# Patient Record
Sex: MALE | Race: WHITE | NOT HISPANIC OR LATINO | Employment: FULL TIME | URBAN - NONMETROPOLITAN AREA
[De-identification: names, ages, dates, MRNs, and addresses within clinical notes are randomized per-mention and may not be internally consistent; named-entity substitution may affect disease eponyms.]

---

## 2017-02-27 ENCOUNTER — OFFICE VISIT (OUTPATIENT)
Dept: GASTROENTEROLOGY | Facility: CLINIC | Age: 42
End: 2017-02-27

## 2017-02-27 VITALS
HEART RATE: 84 BPM | HEIGHT: 70 IN | WEIGHT: 208.5 LBS | DIASTOLIC BLOOD PRESSURE: 79 MMHG | SYSTOLIC BLOOD PRESSURE: 123 MMHG | BODY MASS INDEX: 29.85 KG/M2

## 2017-02-27 DIAGNOSIS — K21.9 GASTROESOPHAGEAL REFLUX DISEASE, ESOPHAGITIS PRESENCE NOT SPECIFIED: ICD-10-CM

## 2017-02-27 DIAGNOSIS — K62.5 HEMORRHAGE OF ANUS AND RECTUM: ICD-10-CM

## 2017-02-27 DIAGNOSIS — K57.32 DIVERTICULITIS OF LARGE INTESTINE WITHOUT PERFORATION OR ABSCESS WITHOUT BLEEDING: Primary | ICD-10-CM

## 2017-02-27 PROCEDURE — 99243 OFF/OP CNSLTJ NEW/EST LOW 30: CPT | Performed by: PHYSICIAN ASSISTANT

## 2017-02-27 RX ORDER — SODIUM CHLORIDE 0.9 % (FLUSH) 0.9 %
1-10 SYRINGE (ML) INJECTION AS NEEDED
Status: CANCELLED | OUTPATIENT
Start: 2017-02-27

## 2017-02-27 RX ORDER — DEXTROSE AND SODIUM CHLORIDE 5; .45 G/100ML; G/100ML
30 INJECTION, SOLUTION INTRAVENOUS CONTINUOUS PRN
Status: CANCELLED | OUTPATIENT
Start: 2017-02-27

## 2017-02-28 RX ORDER — SODIUM, POTASSIUM,MAG SULFATES 17.5-3.13G
SOLUTION, RECONSTITUTED, ORAL ORAL
Qty: 1 BOTTLE | Refills: 0 | Status: ON HOLD | OUTPATIENT
Start: 2017-02-28 | End: 2017-04-07

## 2017-04-07 ENCOUNTER — ANESTHESIA EVENT (OUTPATIENT)
Dept: GASTROENTEROLOGY | Facility: HOSPITAL | Age: 42
End: 2017-04-07

## 2017-04-07 ENCOUNTER — HOSPITAL ENCOUNTER (OUTPATIENT)
Facility: HOSPITAL | Age: 42
Setting detail: HOSPITAL OUTPATIENT SURGERY
Discharge: HOME | End: 2017-04-07
Attending: INTERNAL MEDICINE | Admitting: INTERNAL MEDICINE

## 2017-04-07 ENCOUNTER — ANESTHESIA (OUTPATIENT)
Dept: GASTROENTEROLOGY | Facility: HOSPITAL | Age: 42
End: 2017-04-07

## 2017-04-07 VITALS
HEIGHT: 70 IN | TEMPERATURE: 96.6 F | HEART RATE: 87 BPM | WEIGHT: 198.63 LBS | RESPIRATION RATE: 18 BRPM | SYSTOLIC BLOOD PRESSURE: 108 MMHG | DIASTOLIC BLOOD PRESSURE: 75 MMHG | OXYGEN SATURATION: 96 % | BODY MASS INDEX: 28.44 KG/M2

## 2017-04-07 DIAGNOSIS — K57.32 DIVERTICULITIS OF LARGE INTESTINE WITHOUT PERFORATION OR ABSCESS WITHOUT BLEEDING: ICD-10-CM

## 2017-04-07 DIAGNOSIS — K62.5 HEMORRHAGE OF ANUS AND RECTUM: ICD-10-CM

## 2017-04-07 DIAGNOSIS — K21.9 GASTROESOPHAGEAL REFLUX DISEASE, ESOPHAGITIS PRESENCE NOT SPECIFIED: ICD-10-CM

## 2017-04-07 PROCEDURE — 25010000002 PROPOFOL 10 MG/ML EMULSION: Performed by: NURSE ANESTHETIST, CERTIFIED REGISTERED

## 2017-04-07 PROCEDURE — 25010000002 MIDAZOLAM PER 1 MG: Performed by: NURSE ANESTHETIST, CERTIFIED REGISTERED

## 2017-04-07 PROCEDURE — 45380 COLONOSCOPY AND BIOPSY: CPT | Performed by: INTERNAL MEDICINE

## 2017-04-07 PROCEDURE — 88305 TISSUE EXAM BY PATHOLOGIST: CPT | Performed by: PATHOLOGY

## 2017-04-07 PROCEDURE — 88313 SPECIAL STAINS GROUP 2: CPT | Performed by: INTERNAL MEDICINE

## 2017-04-07 PROCEDURE — 88313 SPECIAL STAINS GROUP 2: CPT | Performed by: PATHOLOGY

## 2017-04-07 PROCEDURE — 43239 EGD BIOPSY SINGLE/MULTIPLE: CPT | Performed by: INTERNAL MEDICINE

## 2017-04-07 PROCEDURE — 25010000002 FENTANYL CITRATE (PF) 100 MCG/2ML SOLUTION: Performed by: NURSE ANESTHETIST, CERTIFIED REGISTERED

## 2017-04-07 PROCEDURE — 88305 TISSUE EXAM BY PATHOLOGIST: CPT | Performed by: INTERNAL MEDICINE

## 2017-04-07 RX ORDER — DEXTROSE AND SODIUM CHLORIDE 5; .45 G/100ML; G/100ML
30 INJECTION, SOLUTION INTRAVENOUS CONTINUOUS PRN
Status: DISCONTINUED | OUTPATIENT
Start: 2017-04-07 | End: 2017-04-07 | Stop reason: HOSPADM

## 2017-04-07 RX ORDER — PROPOFOL 10 MG/ML
VIAL (ML) INTRAVENOUS AS NEEDED
Status: DISCONTINUED | OUTPATIENT
Start: 2017-04-07 | End: 2017-04-07 | Stop reason: SURG

## 2017-04-07 RX ORDER — MIDAZOLAM HYDROCHLORIDE 1 MG/ML
INJECTION INTRAMUSCULAR; INTRAVENOUS AS NEEDED
Status: DISCONTINUED | OUTPATIENT
Start: 2017-04-07 | End: 2017-04-07 | Stop reason: SURG

## 2017-04-07 RX ORDER — FENTANYL CITRATE 50 UG/ML
INJECTION, SOLUTION INTRAMUSCULAR; INTRAVENOUS AS NEEDED
Status: DISCONTINUED | OUTPATIENT
Start: 2017-04-07 | End: 2017-04-07 | Stop reason: SURG

## 2017-04-07 RX ORDER — LIDOCAINE HYDROCHLORIDE 10 MG/ML
INJECTION, SOLUTION INFILTRATION; PERINEURAL AS NEEDED
Status: DISCONTINUED | OUTPATIENT
Start: 2017-04-07 | End: 2017-04-07 | Stop reason: SURG

## 2017-04-07 RX ORDER — SODIUM CHLORIDE, SODIUM GLUCONATE, SODIUM ACETATE, POTASSIUM CHLORIDE, AND MAGNESIUM CHLORIDE 526; 502; 368; 37; 30 MG/100ML; MG/100ML; MG/100ML; MG/100ML; MG/100ML
INJECTION, SOLUTION INTRAVENOUS CONTINUOUS PRN
Status: DISCONTINUED | OUTPATIENT
Start: 2017-04-07 | End: 2017-04-07 | Stop reason: SURG

## 2017-04-07 RX ADMIN — SODIUM CHLORIDE, SODIUM GLUCONATE, SODIUM ACETATE, POTASSIUM CHLORIDE, AND MAGNESIUM CHLORIDE: 526; 502; 368; 37; 30 INJECTION, SOLUTION INTRAVENOUS at 14:41

## 2017-04-07 RX ADMIN — PROPOFOL 50 MG: 10 INJECTION, EMULSION INTRAVENOUS at 14:55

## 2017-04-07 RX ADMIN — PROPOFOL 50 MG: 10 INJECTION, EMULSION INTRAVENOUS at 14:59

## 2017-04-07 RX ADMIN — FENTANYL CITRATE 50 MCG: 50 INJECTION, SOLUTION INTRAMUSCULAR; INTRAVENOUS at 14:44

## 2017-04-07 RX ADMIN — DEXTROSE AND SODIUM CHLORIDE 30 ML/HR: 5; 450 INJECTION, SOLUTION INTRAVENOUS at 14:09

## 2017-04-07 RX ADMIN — MIDAZOLAM 1 MG: 1 INJECTION INTRAMUSCULAR; INTRAVENOUS at 14:42

## 2017-04-07 RX ADMIN — LIDOCAINE HYDROCHLORIDE 50 MG: 10 INJECTION, SOLUTION INFILTRATION; PERINEURAL at 14:44

## 2017-04-07 RX ADMIN — MIDAZOLAM 1 MG: 1 INJECTION INTRAMUSCULAR; INTRAVENOUS at 14:44

## 2017-04-07 RX ADMIN — FENTANYL CITRATE 50 MCG: 50 INJECTION, SOLUTION INTRAMUSCULAR; INTRAVENOUS at 14:42

## 2017-04-07 RX ADMIN — PROPOFOL 50 MG: 10 INJECTION, EMULSION INTRAVENOUS at 14:50

## 2017-04-07 RX ADMIN — PROPOFOL 50 MG: 10 INJECTION, EMULSION INTRAVENOUS at 14:44

## 2017-04-07 RX ADMIN — PROPOFOL 20 MG: 10 INJECTION, EMULSION INTRAVENOUS at 14:47

## 2017-04-07 NOTE — ANESTHESIA POSTPROCEDURE EVALUATION
Patient: Grover Lee    Procedure Summary     Date Anesthesia Start Anesthesia Stop Room / Location    04/07/17 1440 1501 [facility] ENDOSCOPY 1 / [facility] ENDOSCOPY       Procedure Diagnosis Surgeon Provider    ESOPHAGOGASTRODUODENOSCOPY (N/A Esophagus); COLONOSCOPY (N/A ) Hemorrhage of anus and rectum; Diverticulitis of large intestine without perforation or abscess without bleeding; Gastroesophageal reflux disease, esophagitis presence not specified  (Hemorrhage of anus and rectum [K62.5]; Diverticulitis of large intestine without perforation or abscess without bleeding [K57.32]; Gastroesophageal reflux disease, esophagitis presence not specified [K21.9]) MD Iram Kirk CRNA          Anesthesia Type: MAC  Last vitals  BP      Temp      Pulse     Resp      SpO2        Post Anesthesia Care and Evaluation    Patient location during evaluation: bedside  Patient participation: complete - patient participated  Level of consciousness: awake and awake and alert  Pain score: 0  Pain management: adequate  Airway patency: patent  Anesthetic complications: No anesthetic complications  PONV Status: none  Cardiovascular status: acceptable  Respiratory status: acceptable  Hydration status: acceptable

## 2017-04-07 NOTE — PLAN OF CARE
Problem: Patient Care Overview (Adult)  Goal: Plan of Care Review  Outcome: Ongoing (interventions implemented as appropriate)    04/07/17 1501   Coping/Psychosocial Response Interventions   Plan Of Care Reviewed With patient   Patient Care Overview   Progress no change         Problem: GI Endoscopy (Adult)  Goal: Signs and Symptoms of Listed Potential Problems Will be Absent or Manageable (GI Endoscopy)  Outcome: Ongoing (interventions implemented as appropriate)    04/07/17 1501   GI Endoscopy   Problems Assessed (GI Endoscopy) all   Problems Present (GI Endoscopy) none

## 2017-04-07 NOTE — H&P
Progress Notes  Encounter Date: 4/7/2017  Grvoer rudd  Gastroenterology   Expand All Collapse All    []Hide copied text  []Hover for attribution information  Chief Complaint   Patient presents with   • Abdominal Pain   • Diverticulitis         ENDO PROCEDURE ORDERED: EGD/COLON bx, GERD/diverticulitis        Subjective        Grover Lee is a 41 y.o. male. he is being seen for consultation today at the request of MARBELLA Fuller.     History of Present Illness     This 41-year-old  was sent for consultation abdominal pain, diverticulitis by Vitaly Coats who saw the patient with complaints of abdominal pain on 2/16/17. Patient states he had pain in the low abdomen, from his penis to his rectum. He was having fever, he went to urgent care but they sent him to the emergency room. He was doing better until last week. He felt sick when he woke up, it seemed to get worse. He states he was given antibiotics but no studies were performed. He is on Bentyl for IBS symptoms. He has had some bright red blood per rectum which he thinks is from hemorrhoids. He does have frequent heartburn and takes Prilosec regularly. He also uses baking soda. He denied nausea, vomiting, dysphagia. His weight is been fairly stable. He has never had endoscopy.     Patient previously went to the ER on 10/10/16 was told he had diverticulitis and treated with Cipro, Flagyl, Bentyl. CMP showed a creatinine 1.2, otherwise normal. Urinalysis showed trace abnormalities with blood and bacteria. CBC showed a white count of 17.8, hemoglobin 14.7, 236 platelets. A CT scan abdomen pelvis without contrast showed atelectasis of the lungs, liver, gallbladder, spleen, pancreas appeared normal. Multiple colonic diverticuli. Segmented wall thickening with air in the mid to sigmoid with stranding, suggestive of acute diverticulitis, no nodes, no collection, mild degeneration the spine. He states the antibiotics seemed to make him  sick or and he went back on 10/12/16 CBC showed a white count of 13.5, hemoglobin 13.5, 220 platelets. Urinalysis showed ketones and trace abnormalities. CMP showed glucose 114, otherwise normal. Normal lipase. Flat and upright of the abdomen was normal.     Patient uses chewing tobacco for the past 20 years, strongly urged quit. Denied alcohol, illicit substance use. He has a history of a broken back, broken left arm, vasectomy, double hernia. Family history of diverticular disease, no known family history GI tract malignancy. Father, mother, spouse in good health.  since 1993. Mother's had severe diverticular disease. 2 sisters in good health, one child in good health.     A/P: GERD, abdominal pain, apparent diverticular disease with most likely recurrent diverticulitis. Certainly another form of colitis cannot be totally excluded. Doubt but cannot exclude malignancy. Recommend EGD with colonoscopy and biopsies. Consider trial on Asacol and this was briefly discussed with the patient. Recommend he continue on current medications. We'll see him in follow-up after the above, further pending clinical course and the results of the above.     Thank you very much Vitaly for this consultation and for allowing us to participate in the care of your patient. We'll keep you informed.      The following portions of the patient's history were reviewed and updated as appropriate:    Medical History         Past Medical History   Diagnosis Date   • Diverticulitis            Surgical History          Past Surgical History   Procedure Laterality Date   • Hernia repair       • Knee arthroscopy       • Vasectomy                   Family History   Problem Relation Age of Onset   • Diverticulitis Mother     • No Known Problems Father           No Known Allergies   Social History    Social History            Social History   • Marital status:        Spouse name: N/A   • Number of children: N/A   • Years of education: N/A  "           Social History Main Topics   • Smoking status: Current Every Day Smoker       Packs/day: 0.50   • Smokeless tobacco: Current User   • Alcohol use No   • Drug use: No   • Sexual activity: Not Asked           Other Topics Concern   • None      Social History Narrative            Current Outpatient Prescriptions:   • dicyclomine (BENTYL) 10 MG capsule, Take 1 capsule by mouth 4 (Four) Times a Day As Needed (as needed for abdominal cramping)., Disp: 40 capsule, Rfl: 0  • Omeprazole Magnesium (PRILOSEC OTC PO), Take 22.3 mg by mouth Daily., Disp: , Rfl:   • ondansetron ODT (ZOFRAN-ODT) 4 MG disintegrating tablet, Take 1 tablet by mouth Every 8 (Eight) Hours As Needed for nausea or vomiting., Disp: 30 tablet, Rfl: 0  • sodium-potassium-magnesium sulfates (SUPREP) solution oral solution, As Directed, Disp: 1 bottle, Rfl: 0  Review of Systems  Review of Systems   Constitutional: Positive for fever.   Gastrointestinal: Positive for abdominal pain, blood in stool and diarrhea.             Objective                Visit Vitals   • /79 (BP Location: Left arm)   • Pulse 84   • Ht 70\" (177.8 cm)   • Wt 208 lb 8 oz (94.6 kg)   • BMI 29.92 kg/m2      Physical Exam   Constitutional: He is oriented to person, place, and time. He appears well-developed and well-nourished. No distress.   HENT:   Head: Normocephalic and atraumatic.   Eyes: EOM are normal. Pupils are equal, round, and reactive to light.   Neck: Normal range of motion.   Cardiovascular: Normal rate, regular rhythm and normal heart sounds.   Pulmonary/Chest: Effort normal and breath sounds normal.   Abdominal: Soft. Bowel sounds are normal. He exhibits no shifting dullness, no distension, no abdominal bruit, no ascites and no mass. There is no hepatosplenomegaly. There is tenderness. There is no rigidity, no rebound, no guarding and no CVA tenderness. No hernia. Hernia confirmed negative in the ventral area.   Musculoskeletal: Normal range of motion. "   Neurological: He is alert and oriented to person, place, and time.   Skin: Skin is warm and dry.   Psychiatric: He has a normal mood and affect. His behavior is normal. Judgment and thought content normal.   Nursing note and vitals reviewed.     No results found for any previous visit.     Assessment/Plan           1. Diverticulitis of large intestine without perforation or abscess without bleeding    2. Hemorrhage of anus and rectum    3. Gastroesophageal reflux disease, esophagitis presence not specified    .   Grover was seen today for abdominal pain and diverticulitis.     Diagnoses and all orders for this visit:     Diverticulitis of large intestine without perforation or abscess without bleeding  - Case Request; Standing  - sodium chloride 0.9 % flush 1-10 mL; Infuse 1-10 mL into a venous catheter As Needed for line care.  - dextrose 5 % and sodium chloride 0.45 % infusion; Infuse 30 mL/hr into a venous catheter Continuous As Needed (Start Prior to Procedure).  - Case Request     Hemorrhage of anus and rectum  - Case Request; Standing  - sodium chloride 0.9 % flush 1-10 mL; Infuse 1-10 mL into a venous catheter As Needed for line care.  - dextrose 5 % and sodium chloride 0.45 % infusion; Infuse 30 mL/hr into a venous catheter Continuous As Needed (Start Prior to Procedure).  - Case Request     Gastroesophageal reflux disease, esophagitis presence not specified  - Case Request; Standing  - sodium chloride 0.9 % flush 1-10 mL; Infuse 1-10 mL into a venous catheter As Needed for line care.  - Case Request     Other orders  - Obtain Informed Consent; Standing  - Verify Informed Consent; Standing  - POC Glucose Fingerstick; Standing  - Insert Peripheral IV; Standing  - Saline Lock & Maintain IV Access; Standing  - sodium-potassium-magnesium sulfates (SUPREP) solution oral solution; As Directed           Orders placed during this encounter include:  No orders of the defined types were placed in this  encounter.        Medications prescribed:       New Medications Ordered This Visit   Medications   • sodium-potassium-magnesium sulfates (SUPREP) solution oral solution       Sig: As Directed       Dispense: 1 bottle       Refill: 0         Requested Prescriptions             Signed Prescriptions Disp Refills   • sodium-potassium-magnesium sulfates (SUPREP) solution oral solution 1 bottle 0       Sig: As Directed         Review and/or summary of lab tests, radiology, procedures, medications. Review and summary of old records and obtaining of history. The risks and benefits of my recommendations, as well as other treatment options were discussed with the patient today. Questions were answered.     Follow-up: Return if symptoms worsen or fail to improve, for After the above.     ESOPHAGOGASTRODUODENOSCOPY (N/A), COLONOSCOPY (N/A)        This document has been electronically signed by Quincy Yousif PA-C on March 10, 2017 12:07 PM      No results found for this or any previous visit.     Some portions of this note have been dictated using voice recognition software and may contain errors and/or omissions.            Office Visit on 2/27/2017              Detailed Report

## 2017-04-07 NOTE — ANESTHESIA PREPROCEDURE EVALUATION
Anesthesia Evaluation     Patient summary reviewed      Airway   Mallampati: II  Neck ROM: full  no difficulty expected  Dental      Pulmonary - normal exam   (+) a smoker Current,   Cardiovascular - normal exam        Neuro/Psych  GI/Hepatic/Renal/Endo    (+)  GERD well controlled,     Musculoskeletal     Abdominal    Substance History   (+) alcohol use,      OB/GYN          Other        ROS/Med Hx Other: diverticulitis                          Anesthesia Plan    ASA 2     MAC     intravenous induction   Anesthetic plan and risks discussed with patient.

## 2017-04-10 LAB
LAB AP CASE REPORT: NORMAL
Lab: NORMAL
PATH REPORT.FINAL DX SPEC: NORMAL
PATH REPORT.GROSS SPEC: NORMAL

## 2017-05-01 ENCOUNTER — OFFICE VISIT (OUTPATIENT)
Dept: GASTROENTEROLOGY | Facility: CLINIC | Age: 42
End: 2017-05-01

## 2017-05-01 VITALS
WEIGHT: 204.4 LBS | SYSTOLIC BLOOD PRESSURE: 112 MMHG | HEIGHT: 70 IN | BODY MASS INDEX: 29.26 KG/M2 | DIASTOLIC BLOOD PRESSURE: 75 MMHG | HEART RATE: 65 BPM

## 2017-05-01 DIAGNOSIS — R10.13 EPIGASTRIC PAIN: ICD-10-CM

## 2017-05-01 DIAGNOSIS — K29.00 OTHER ACUTE GASTRITIS: ICD-10-CM

## 2017-05-01 DIAGNOSIS — K21.00 GASTROESOPHAGEAL REFLUX DISEASE WITH ESOPHAGITIS: Primary | ICD-10-CM

## 2017-05-01 PROCEDURE — 99214 OFFICE O/P EST MOD 30 MIN: CPT | Performed by: PHYSICIAN ASSISTANT

## 2017-05-01 RX ORDER — PANTOPRAZOLE SODIUM 40 MG/1
40 TABLET, DELAYED RELEASE ORAL DAILY
Qty: 30 TABLET | Refills: 5 | Status: SHIPPED | OUTPATIENT
Start: 2017-05-01 | End: 2017-11-08

## 2017-05-04 ENCOUNTER — TRANSCRIBE ORDERS (OUTPATIENT)
Dept: LAB | Facility: HOSPITAL | Age: 42
End: 2017-05-04

## 2017-11-08 ENCOUNTER — OFFICE VISIT (OUTPATIENT)
Dept: GASTROENTEROLOGY | Facility: CLINIC | Age: 42
End: 2017-11-08

## 2017-11-08 ENCOUNTER — APPOINTMENT (OUTPATIENT)
Dept: LAB | Facility: HOSPITAL | Age: 42
End: 2017-11-08

## 2017-11-08 VITALS
BODY MASS INDEX: 30.26 KG/M2 | SYSTOLIC BLOOD PRESSURE: 112 MMHG | WEIGHT: 211.4 LBS | HEART RATE: 68 BPM | HEIGHT: 70 IN | DIASTOLIC BLOOD PRESSURE: 82 MMHG

## 2017-11-08 DIAGNOSIS — R10.13 EPIGASTRIC PAIN: ICD-10-CM

## 2017-11-08 DIAGNOSIS — K57.32 DIVERTICULITIS OF LARGE INTESTINE WITHOUT PERFORATION OR ABSCESS WITHOUT BLEEDING: ICD-10-CM

## 2017-11-08 DIAGNOSIS — K21.00 GASTROESOPHAGEAL REFLUX DISEASE WITH ESOPHAGITIS: Primary | ICD-10-CM

## 2017-11-08 LAB
ALBUMIN SERPL-MCNC: 4.5 G/DL (ref 3.4–4.8)
ALBUMIN/GLOB SERPL: 1.3 G/DL (ref 1.1–1.8)
ALP SERPL-CCNC: 50 U/L (ref 38–126)
ALT SERPL W P-5'-P-CCNC: 51 U/L (ref 21–72)
ANION GAP SERPL CALCULATED.3IONS-SCNC: 13 MMOL/L (ref 5–15)
AST SERPL-CCNC: 74 U/L (ref 17–59)
BASOPHILS # BLD AUTO: 0.05 10*3/MM3 (ref 0–0.2)
BASOPHILS NFR BLD AUTO: 0.6 % (ref 0–2)
BILIRUB SERPL-MCNC: 0.3 MG/DL (ref 0.2–1.3)
BUN BLD-MCNC: 10 MG/DL (ref 7–21)
BUN/CREAT SERPL: 9.7 (ref 7–25)
CALCIUM SPEC-SCNC: 9.5 MG/DL (ref 8.4–10.2)
CHLORIDE SERPL-SCNC: 100 MMOL/L (ref 95–110)
CO2 SERPL-SCNC: 27 MMOL/L (ref 22–31)
CREAT BLD-MCNC: 1.03 MG/DL (ref 0.7–1.3)
DEPRECATED RDW RBC AUTO: 38.3 FL (ref 35.1–43.9)
EOSINOPHIL # BLD AUTO: 0.34 10*3/MM3 (ref 0–0.7)
EOSINOPHIL NFR BLD AUTO: 3.8 % (ref 0–7)
ERYTHROCYTE [DISTWIDTH] IN BLOOD BY AUTOMATED COUNT: 12.1 % (ref 11.5–14.5)
GFR SERPL CREATININE-BSD FRML MDRD: 78 ML/MIN/1.73 (ref 63–147)
GLOBULIN UR ELPH-MCNC: 3.4 GM/DL (ref 2.3–3.5)
GLUCOSE BLD-MCNC: 91 MG/DL (ref 60–100)
HCT VFR BLD AUTO: 41.4 % (ref 39–49)
HGB BLD-MCNC: 14.3 G/DL (ref 13.7–17.3)
IMM GRANULOCYTES # BLD: 0.02 10*3/MM3 (ref 0–0.02)
IMM GRANULOCYTES NFR BLD: 0.2 % (ref 0–0.5)
LYMPHOCYTES # BLD AUTO: 3.13 10*3/MM3 (ref 0.6–4.2)
LYMPHOCYTES NFR BLD AUTO: 34.6 % (ref 10–50)
MCH RBC QN AUTO: 29.6 PG (ref 26.5–34)
MCHC RBC AUTO-ENTMCNC: 34.5 G/DL (ref 31.5–36.3)
MCV RBC AUTO: 85.7 FL (ref 80–98)
MONOCYTES # BLD AUTO: 0.85 10*3/MM3 (ref 0–0.9)
MONOCYTES NFR BLD AUTO: 9.4 % (ref 0–12)
NEUTROPHILS # BLD AUTO: 4.66 10*3/MM3 (ref 2–8.6)
NEUTROPHILS NFR BLD AUTO: 51.4 % (ref 37–80)
PLATELET # BLD AUTO: 218 10*3/MM3 (ref 150–450)
PMV BLD AUTO: 9.7 FL (ref 8–12)
POTASSIUM BLD-SCNC: 4.5 MMOL/L (ref 3.5–5.1)
PROT SERPL-MCNC: 7.9 G/DL (ref 6.3–8.6)
RBC # BLD AUTO: 4.83 10*6/MM3 (ref 4.37–5.74)
SODIUM BLD-SCNC: 140 MMOL/L (ref 137–145)
WBC NRBC COR # BLD: 9.05 10*3/MM3 (ref 3.2–9.8)

## 2017-11-08 PROCEDURE — 85025 COMPLETE CBC W/AUTO DIFF WBC: CPT | Performed by: PHYSICIAN ASSISTANT

## 2017-11-08 PROCEDURE — 80053 COMPREHEN METABOLIC PANEL: CPT | Performed by: PHYSICIAN ASSISTANT

## 2017-11-08 PROCEDURE — 99214 OFFICE O/P EST MOD 30 MIN: CPT | Performed by: PHYSICIAN ASSISTANT

## 2017-11-08 PROCEDURE — 36415 COLL VENOUS BLD VENIPUNCTURE: CPT | Performed by: PHYSICIAN ASSISTANT

## 2017-11-08 RX ORDER — DEXLANSOPRAZOLE 60 MG/1
60 CAPSULE, DELAYED RELEASE ORAL DAILY
Qty: 90 CAPSULE | Refills: 1 | Status: SHIPPED | OUTPATIENT
Start: 2017-11-08 | End: 2017-12-08

## 2017-11-08 RX ORDER — ONDANSETRON 4 MG/1
4 TABLET, ORALLY DISINTEGRATING ORAL EVERY 8 HOURS PRN
Qty: 30 TABLET | Refills: 1 | Status: SHIPPED | OUTPATIENT
Start: 2017-11-08 | End: 2019-07-09 | Stop reason: SDUPTHER

## 2017-11-08 RX ORDER — OMEPRAZOLE 20 MG/1
20 TABLET, DELAYED RELEASE ORAL AS NEEDED
COMMUNITY
End: 2017-12-14 | Stop reason: ALTCHOICE

## 2017-11-08 RX ORDER — METRONIDAZOLE 500 MG/1
500 TABLET ORAL 2 TIMES DAILY
Qty: 20 TABLET | Refills: 0 | Status: SHIPPED | OUTPATIENT
Start: 2017-11-08 | End: 2018-01-17

## 2017-11-08 NOTE — PROGRESS NOTES
Chief Complaint   Patient presents with   • Abdominal Pain       ENDO PROCEDURE ORDERED:    Subjective    Grover Lee is a 42 y.o. male. he is here today for follow-up.    History of Present Illness    Patient seen on a recheck of his epigastric pain.  Last seen 5/1/17.  Patient stated he had a flare.  He had some Flagyl and took the medication.  He got better after taking it for a few days.  He states he's had no significant symptoms since that time.  He states in the past he has used Cipro, Flagyl, Zofran when he gets a flare.  He currently denies abdominal pain, nausea vomiting, bowels are moving without blood.  Weight is up 7 pounds since last visit.  Last EGD/colonoscopy on 4/7/17 showed esophagitis, gastritis, diverticulosis.     A/P: Patient with recurrent bouts of abdominal pain, known diverticular disease, GERD.  Recommend CBC, CMP.  I refilled his Zofran, will try switching to Dexilant given his breakthrough GERD symptoms.  I gave him another round of Flagyl, but I asked him to call if he has another bout of pain.  We'll consider imaging, laboratories it is severe.  Otherwise plan follow-up in 6 months, sooner if needed.     The following portions of the patient's history were reviewed and updated as appropriate:   Past Medical History:   Diagnosis Date   • Diverticulitis      Past Surgical History:   Procedure Laterality Date   • COLONOSCOPY N/A 4/7/2017    Procedure: COLONOSCOPY;  Surgeon: Grover Pearson MD;  Location: [facility] ENDOSCOPY;  Service:    • ENDOSCOPY N/A 4/7/2017    Procedure: ESOPHAGOGASTRODUODENOSCOPY;  Surgeon: Grover Pearson MD;  Location: [facility] ENDOSCOPY;  Service:    • HERNIA REPAIR     • KNEE ARTHROSCOPY     • UPPER GASTROINTESTINAL ENDOSCOPY  04/07/2017   • VASECTOMY       Family History   Problem Relation Age of Onset   • Diverticulitis Mother    • No Known Problems Father        No Known Allergies  Social History     Social History   • Marital status:      Spouse  "name: N/A   • Number of children: N/A   • Years of education: N/A     Social History Main Topics   • Smoking status: Former Smoker   • Smokeless tobacco: Current User      Comment: smokes occasionally    • Alcohol use No   • Drug use: No   • Sexual activity: Not Asked     Other Topics Concern   • None     Social History Narrative       Current Outpatient Prescriptions:   •  omeprazole OTC (PRILOSEC OTC) 20 MG EC tablet, Take 20 mg by mouth As Needed., Disp: , Rfl:   •  ondansetron ODT (ZOFRAN-ODT) 4 MG disintegrating tablet, Take 1 tablet by mouth Every 8 (Eight) Hours As Needed for Nausea or Vomiting., Disp: 30 tablet, Rfl: 1  •  dexlansoprazole (DEXILANT) 60 MG capsule, Take 1 capsule by mouth Daily for 30 days., Disp: 90 capsule, Rfl: 1  •  dicyclomine (BENTYL) 10 MG capsule, Take 1 capsule by mouth 4 (Four) Times a Day As Needed (as needed for abdominal cramping)., Disp: 40 capsule, Rfl: 0  •  metroNIDAZOLE (FLAGYL) 500 MG tablet, Take 1 tablet by mouth 2 (Two) Times a Day., Disp: 20 tablet, Rfl: 0  Review of Systems  Review of Systems       Objective    /82 (BP Location: Left arm)  Pulse 68  Ht 70\" (177.8 cm)  Wt 211 lb 6.4 oz (95.9 kg)  BMI 30.33 kg/m2  Physical Exam   Constitutional: He is oriented to person, place, and time. He appears well-developed and well-nourished. No distress.   HENT:   Head: Normocephalic and atraumatic.   Eyes: EOM are normal. Pupils are equal, round, and reactive to light.   Neck: Normal range of motion.   Cardiovascular: Normal rate, regular rhythm and normal heart sounds.    Pulmonary/Chest: Effort normal and breath sounds normal.   Abdominal: Soft. Bowel sounds are normal. He exhibits no shifting dullness, no distension, no abdominal bruit, no ascites and no mass. There is no hepatosplenomegaly. There is tenderness. There is no rigidity, no rebound, no guarding and no CVA tenderness. No hernia. Hernia confirmed negative in the ventral area.   Musculoskeletal: Normal " range of motion.   Neurological: He is alert and oriented to person, place, and time.   Skin: Skin is warm and dry.   Psychiatric: He has a normal mood and affect. His behavior is normal. Judgment and thought content normal.   Nursing note and vitals reviewed.    Assessment/Plan      1. Gastroesophageal reflux disease with esophagitis    2. Epigastric pain    3. Diverticulitis of large intestine without perforation or abscess without bleeding    4. Diverticulitis of large intestine without perforation or abscess without bleeding    .   Grover was seen today for abdominal pain.    Diagnoses and all orders for this visit:    Gastroesophageal reflux disease with esophagitis  -     CBC Auto Differential  -     Comprehensive Metabolic Panel    Epigastric pain  -     CBC Auto Differential  -     Comprehensive Metabolic Panel    Diverticulitis of large intestine without perforation or abscess without bleeding  Comments:  recurent  Orders:  -     ondansetron ODT (ZOFRAN-ODT) 4 MG disintegrating tablet; Take 1 tablet by mouth Every 8 (Eight) Hours As Needed for Nausea or Vomiting.  -     CBC Auto Differential  -     Comprehensive Metabolic Panel    Diverticulitis of large intestine without perforation or abscess without bleeding  -     ondansetron ODT (ZOFRAN-ODT) 4 MG disintegrating tablet; Take 1 tablet by mouth Every 8 (Eight) Hours As Needed for Nausea or Vomiting.  -     CBC Auto Differential  -     Comprehensive Metabolic Panel    Other orders  -     dexlansoprazole (DEXILANT) 60 MG capsule; Take 1 capsule by mouth Daily for 30 days.  -     metroNIDAZOLE (FLAGYL) 500 MG tablet; Take 1 tablet by mouth 2 (Two) Times a Day.        Orders placed during this encounter include:  Orders Placed This Encounter   Procedures   • CBC Auto Differential   • Comprehensive Metabolic Panel       Medications prescribed:  New Medications Ordered This Visit   Medications   • dexlansoprazole (DEXILANT) 60 MG capsule     Sig: Take 1 capsule  by mouth Daily for 30 days.     Dispense:  90 capsule     Refill:  1   • ondansetron ODT (ZOFRAN-ODT) 4 MG disintegrating tablet     Sig: Take 1 tablet by mouth Every 8 (Eight) Hours As Needed for Nausea or Vomiting.     Dispense:  30 tablet     Refill:  1   • metroNIDAZOLE (FLAGYL) 500 MG tablet     Sig: Take 1 tablet by mouth 2 (Two) Times a Day.     Dispense:  20 tablet     Refill:  0     Discontinued Medications       Reason for Discontinue    pantoprazole (PROTONIX) 40 MG EC tablet Not Efficacious        Requested Prescriptions     Signed Prescriptions Disp Refills   • dexlansoprazole (DEXILANT) 60 MG capsule 90 capsule 1     Sig: Take 1 capsule by mouth Daily for 30 days.   • ondansetron ODT (ZOFRAN-ODT) 4 MG disintegrating tablet 30 tablet 1     Sig: Take 1 tablet by mouth Every 8 (Eight) Hours As Needed for Nausea or Vomiting.   • metroNIDAZOLE (FLAGYL) 500 MG tablet 20 tablet 0     Sig: Take 1 tablet by mouth 2 (Two) Times a Day.       Review and/or summary of lab tests, radiology, procedures, medications. Review and summary of old records and obtaining of history. The risks and benefits of my recommendations, as well as other treatment options were discussed with the patient today. Questions were answered.    Follow-up: Return in about 6 months (around 5/8/2018), or if symptoms worsen or fail to improve.     * Surgery not found *      This document has been electronically signed by Quincy Yousif PA-C on November 30, 2017 6:58 PM      Results for orders placed or performed in visit on 11/08/17   CBC Auto Differential   Result Value Ref Range    WBC 9.05 3.20 - 9.80 10*3/mm3    RBC 4.83 4.37 - 5.74 10*6/mm3    Hemoglobin 14.3 13.7 - 17.3 g/dL    Hematocrit 41.4 39.0 - 49.0 %    MCV 85.7 80.0 - 98.0 fL    MCH 29.6 26.5 - 34.0 pg    MCHC 34.5 31.5 - 36.3 g/dL    RDW 12.1 11.5 - 14.5 %    RDW-SD 38.3 35.1 - 43.9 fl    MPV 9.7 8.0 - 12.0 fL    Platelets 218 150 - 450 10*3/mm3    Neutrophil % 51.4 37.0 - 80.0  %    Lymphocyte % 34.6 10.0 - 50.0 %    Monocyte % 9.4 0.0 - 12.0 %    Eosinophil % 3.8 0.0 - 7.0 %    Basophil % 0.6 0.0 - 2.0 %    Immature Grans % 0.2 0.0 - 0.5 %    Neutrophils, Absolute 4.66 2.00 - 8.60 10*3/mm3    Lymphocytes, Absolute 3.13 0.60 - 4.20 10*3/mm3    Monocytes, Absolute 0.85 0.00 - 0.90 10*3/mm3    Eosinophils, Absolute 0.34 0.00 - 0.70 10*3/mm3    Basophils, Absolute 0.05 0.00 - 0.20 10*3/mm3    Immature Grans, Absolute 0.02 0.00 - 0.02 10*3/mm3   Comprehensive Metabolic Panel   Result Value Ref Range    Glucose 91 60 - 100 mg/dL    BUN 10 7 - 21 mg/dL    Creatinine 1.03 0.70 - 1.30 mg/dL    Sodium 140 137 - 145 mmol/L    Potassium 4.5 3.5 - 5.1 mmol/L    Chloride 100 95 - 110 mmol/L    CO2 27.0 22.0 - 31.0 mmol/L    Calcium 9.5 8.4 - 10.2 mg/dL    Total Protein 7.9 6.3 - 8.6 g/dL    Albumin 4.50 3.40 - 4.80 g/dL    ALT (SGPT) 51 21 - 72 U/L    AST (SGOT) 74 (H) 17 - 59 U/L    Alkaline Phosphatase 50 38 - 126 U/L    Total Bilirubin 0.3 0.2 - 1.3 mg/dL    eGFR Non  Amer 78 63 - 147 mL/min/1.73    Globulin 3.4 2.3 - 3.5 gm/dL    A/G Ratio 1.3 1.1 - 1.8 g/dL    BUN/Creatinine Ratio 9.7 7.0 - 25.0    Anion Gap 13.0 5.0 - 15.0 mmol/L   Results for orders placed or performed during the hospital encounter of 04/07/17   Tissue Exam   Result Value Ref Range    Case Report       Surgical Pathology Report                         Case: [id number]                                  Authorizing Provider:  Grover Pearson MD         Collected:           04/07/2017 02:49 PM          Ordering Location:     [facility]             Received:            04/07/2017 03:41 PM                                 [facility]                                                     Pathologist:           Prashanth Odom MD                                                          Specimens:   1) - Small Intestine, Duodenum, small bowel                                                         2) - Gastric,  Antrum, biopsy                                                                        3) - Esophagus, Distal, biopsy                                                                      4) - Small Intestine, Ileum, TI biopsy                                                              5) - Large Intestine, colonic mucosa biopsy                                                Final Diagnosis       1.  MUCOSA, DUODENUM:  UNREMARKABLE TALL VILLOUS MUCOSA OF THE DUODENUM.   NEGATIVE FOR PARASITES.     2.  MUCOSA, ANTRUM OF STOMACH:   CONGESTION OF ANTRAL GASTRIC MUCOSA.     3.  MUCOSA, DISTAL ESOPHAGUS:   REACTIVE CHANGE OF SQUAMOUS MUCOSA.   NEGATIVE FOR DYSPLASIA AND GOBLET CELL METAPLASIA (ALCIAN BLUE/PAS STAIN).     4.  MUCOSA, TERMINAL ILEUM:   UNREMARKABLE TALL VILLOUS MUCOSA OF THE TERMINAL ILEUM.    5.  MUCOSA, COLON:   UNREMARKABLE MUCOSA OF THE COLON.       Gross Description       Received for examination are 5 containers, each of which have nodular bits of white soft tissue measuring 0.3-0.5 cc in aggregate.  All specimens are embedded as labeled:  1A duodenum; 2A antrum of stomach; 3A distal esophagus; 4A terminal ileum; 5A mucosa of colon.        Embedded Images         Some portions of this note have been dictated using voice recognition software and may contain errors and/or omissions.

## 2017-12-14 ENCOUNTER — OFFICE VISIT (OUTPATIENT)
Dept: GASTROENTEROLOGY | Facility: CLINIC | Age: 42
End: 2017-12-14

## 2017-12-14 ENCOUNTER — APPOINTMENT (OUTPATIENT)
Dept: LAB | Facility: HOSPITAL | Age: 42
End: 2017-12-14

## 2017-12-14 ENCOUNTER — HOSPITAL ENCOUNTER (OUTPATIENT)
Dept: CT IMAGING | Facility: HOSPITAL | Age: 42
Discharge: HOME | End: 2017-12-14
Admitting: PHYSICIAN ASSISTANT

## 2017-12-14 VITALS
HEART RATE: 92 BPM | SYSTOLIC BLOOD PRESSURE: 128 MMHG | WEIGHT: 216.4 LBS | HEIGHT: 70 IN | BODY MASS INDEX: 30.98 KG/M2 | DIASTOLIC BLOOD PRESSURE: 80 MMHG

## 2017-12-14 DIAGNOSIS — R10.33 PERIUMBILICAL ABDOMINAL PAIN: ICD-10-CM

## 2017-12-14 DIAGNOSIS — K21.00 GASTROESOPHAGEAL REFLUX DISEASE WITH ESOPHAGITIS: ICD-10-CM

## 2017-12-14 DIAGNOSIS — K57.32 DIVERTICULITIS OF LARGE INTESTINE WITHOUT PERFORATION OR ABSCESS WITHOUT BLEEDING: Primary | ICD-10-CM

## 2017-12-14 LAB
BASOPHILS # BLD AUTO: 0.02 10*3/MM3 (ref 0–0.2)
BASOPHILS NFR BLD AUTO: 0.2 % (ref 0–2)
BUN BLD-MCNC: 12 MG/DL (ref 7–21)
CREAT BLD-MCNC: 1.24 MG/DL (ref 0.7–1.3)
DEPRECATED RDW RBC AUTO: 38.2 FL (ref 35.1–43.9)
EOSINOPHIL # BLD AUTO: 0.18 10*3/MM3 (ref 0–0.7)
EOSINOPHIL NFR BLD AUTO: 1.7 % (ref 0–7)
ERYTHROCYTE [DISTWIDTH] IN BLOOD BY AUTOMATED COUNT: 12.1 % (ref 11.5–14.5)
GFR SERPL CREATININE-BSD FRML MDRD: 63 ML/MIN/1.73 (ref 63–147)
HCT VFR BLD AUTO: 43.8 % (ref 39–49)
HGB BLD-MCNC: 14.9 G/DL (ref 13.7–17.3)
IMM GRANULOCYTES # BLD: 0.02 10*3/MM3 (ref 0–0.02)
IMM GRANULOCYTES NFR BLD: 0.2 % (ref 0–0.5)
LYMPHOCYTES # BLD AUTO: 3.01 10*3/MM3 (ref 0.6–4.2)
LYMPHOCYTES NFR BLD AUTO: 28.4 % (ref 10–50)
MCH RBC QN AUTO: 29.3 PG (ref 26.5–34)
MCHC RBC AUTO-ENTMCNC: 34 G/DL (ref 31.5–36.3)
MCV RBC AUTO: 86.1 FL (ref 80–98)
MONOCYTES # BLD AUTO: 1.37 10*3/MM3 (ref 0–0.9)
MONOCYTES NFR BLD AUTO: 12.9 % (ref 0–12)
NEUTROPHILS # BLD AUTO: 5.98 10*3/MM3 (ref 2–8.6)
NEUTROPHILS NFR BLD AUTO: 56.6 % (ref 37–80)
PLATELET # BLD AUTO: 247 10*3/MM3 (ref 150–450)
PMV BLD AUTO: 10.5 FL (ref 8–12)
RBC # BLD AUTO: 5.09 10*6/MM3 (ref 4.37–5.74)
WBC NRBC COR # BLD: 10.58 10*3/MM3 (ref 3.2–9.8)

## 2017-12-14 PROCEDURE — 36415 COLL VENOUS BLD VENIPUNCTURE: CPT | Performed by: PHYSICIAN ASSISTANT

## 2017-12-14 PROCEDURE — 0 DIATRIZOATE MEGLUMINE & SODIUM PER 1 ML: Performed by: PHYSICIAN ASSISTANT

## 2017-12-14 PROCEDURE — 82565 ASSAY OF CREATININE: CPT | Performed by: PHYSICIAN ASSISTANT

## 2017-12-14 PROCEDURE — 84520 ASSAY OF UREA NITROGEN: CPT | Performed by: PHYSICIAN ASSISTANT

## 2017-12-14 PROCEDURE — 0 IOPAMIDOL 61 % SOLUTION: Performed by: PHYSICIAN ASSISTANT

## 2017-12-14 PROCEDURE — 74177 CT ABD & PELVIS W/CONTRAST: CPT

## 2017-12-14 PROCEDURE — 99214 OFFICE O/P EST MOD 30 MIN: CPT | Performed by: PHYSICIAN ASSISTANT

## 2017-12-14 PROCEDURE — 85025 COMPLETE CBC W/AUTO DIFF WBC: CPT | Performed by: PHYSICIAN ASSISTANT

## 2017-12-14 RX ORDER — DEXLANSOPRAZOLE 60 MG/1
60 CAPSULE, DELAYED RELEASE ORAL DAILY
COMMUNITY
End: 2018-02-07 | Stop reason: SDUPTHER

## 2017-12-14 RX ADMIN — DIATRIZOATE MEGLUMINE AND DIATRIZOATE SODIUM 40 ML: 660; 100 LIQUID ORAL; RECTAL at 18:00

## 2017-12-14 RX ADMIN — IOPAMIDOL 95 ML: 612 INJECTION, SOLUTION INTRAVENOUS at 19:15

## 2017-12-14 NOTE — PROGRESS NOTES
Chief Complaint   Patient presents with   • Diverticulitis   • Abdominal Pain       ENDO PROCEDURE ORDERED:    Subjective    Grover Lee is a 42 y.o. male. he is here today for follow-up.    History of Present Illness    Patient seen for acute onset abdominal pain this morning.  He's had recurrent mid to lower abdominal pain in the past without clear diagnosis.  It has been felt likely due to diverticular disease in the past.  He was last seen 11/8/17.  I had given him a prescription for Flagyl and asked him to contact the office if he had another flare to be seen.  He called today stating he had increasing abdominal pain.  It is in the mid to low abdomen.  He thinks he may have had a fever this morning.  He's had normal bowel movements this week but nothing this morning which is unusual for him.  He does have Bentyl.  He thinks that the Dexilant is doing much better for his heartburn.  He complains a 4 out of 10 abdominal pain today.  No nausea, no vomiting.  Weight is up 5 pounds since last visit.  Last colonoscopy 4/7/17.  He did start taking the Flagyl this morning.    Laboratory from last visit CMP showed an AST of 74, otherwise normal.  CBC was normal.    A/P: Acute abdominal pain suspect acute diverticulitis.  Will have patient get laboratory, CT scan abdomen pelvis today and return.  Note: CT scan was done after the office had closed.  I did review his films personally, he does have multiple small diverticulum in the left colon, he appeared to have some stranding and haziness around his sigmoid colon posterior and superior to the bladder.  This was confirmed by official reading by the radiologist.  I did report this to the patient in the waiting room from CT scan with his mother.  I recommended he continue on antibiotics.  2 keep his diet light.  If he had any significant worsening of his symptoms I recommended he go to the emergency room.  He is to let me know in the next few days how he is doing,  as long as he is improved we'll continue with current course.  Consider a trial on 5-ASA given his recurrent bouts of diverticulitis.  At this point would not recommend surgical intervention.     The following portions of the patient's history were reviewed and updated as appropriate:   Past Medical History:   Diagnosis Date   • Diverticulitis      Past Surgical History:   Procedure Laterality Date   • COLONOSCOPY N/A 4/7/2017    Procedure: COLONOSCOPY;  Surgeon: Grover Pearson MD;  Location: [facility] ENDOSCOPY;  Service:    • ENDOSCOPY N/A 4/7/2017    Procedure: ESOPHAGOGASTRODUODENOSCOPY;  Surgeon: Grover Pearson MD;  Location: [facility] ENDOSCOPY;  Service:    • HERNIA REPAIR     • KNEE ARTHROSCOPY     • UPPER GASTROINTESTINAL ENDOSCOPY  04/07/2017   • VASECTOMY       Family History   Problem Relation Age of Onset   • Diverticulitis Mother    • No Known Problems Father        No Known Allergies  Social History     Social History   • Marital status:      Spouse name: N/A   • Number of children: N/A   • Years of education: N/A     Social History Main Topics   • Smoking status: Former Smoker   • Smokeless tobacco: Current User      Comment: smokes occasionally    • Alcohol use No   • Drug use: No   • Sexual activity: Not Asked     Other Topics Concern   • None     Social History Narrative       Current Outpatient Prescriptions:   •  dexlansoprazole (DEXILANT) 60 MG capsule, Take 60 mg by mouth Daily., Disp: , Rfl:   •  dicyclomine (BENTYL) 10 MG capsule, Take 1 capsule by mouth 4 (Four) Times a Day As Needed (as needed for abdominal cramping)., Disp: 40 capsule, Rfl: 0  •  metroNIDAZOLE (FLAGYL) 500 MG tablet, Take 1 tablet by mouth 2 (Two) Times a Day., Disp: 20 tablet, Rfl: 0  •  ondansetron ODT (ZOFRAN-ODT) 4 MG disintegrating tablet, Take 1 tablet by mouth Every 8 (Eight) Hours As Needed for Nausea or Vomiting., Disp: 30 tablet, Rfl: 1  No current facility-administered medications for this visit.   Review of  "Systems  Review of Systems       Objective    /80 (BP Location: Left arm)  Pulse 92  Ht 177.8 cm (70\")  Wt 98.2 kg (216 lb 6.4 oz)  BMI 31.05 kg/m2  Physical Exam   Constitutional: He is oriented to person, place, and time. He appears well-developed and well-nourished. He appears distressed.   Ill appearing   HENT:   Head: Normocephalic and atraumatic.   Eyes: EOM are normal. Pupils are equal, round, and reactive to light.   Neck: Normal range of motion.   Cardiovascular: Normal rate, regular rhythm and normal heart sounds.    Pulmonary/Chest: Effort normal and breath sounds normal.   Abdominal: Soft. Bowel sounds are normal. He exhibits no shifting dullness, no distension, no abdominal bruit, no ascites and no mass. There is no hepatosplenomegaly. There is tenderness. There is no rigidity, no rebound, no guarding and no CVA tenderness. No hernia. Hernia confirmed negative in the ventral area.   Musculoskeletal: Normal range of motion.   Neurological: He is alert and oriented to person, place, and time.   Skin: Skin is warm and dry.   Psychiatric: He has a normal mood and affect. His behavior is normal. Judgment and thought content normal.   Nursing note and vitals reviewed.    Assessment/Plan      1. Diverticulitis of large intestine without perforation or abscess without bleeding    2. Gastroesophageal reflux disease with esophagitis    3. Periumbilical abdominal pain    .   Grover was seen today for diverticulitis and abdominal pain.    Diagnoses and all orders for this visit:    Diverticulitis of large intestine without perforation or abscess without bleeding  -     CT Abdomen Pelvis With Contrast  -     Creatinine, Serum  -     BUN  -     CBC Auto Differential    Gastroesophageal reflux disease with esophagitis  -     CT Abdomen Pelvis With Contrast  -     Creatinine, Serum  -     BUN  -     CBC Auto Differential    Periumbilical abdominal pain  -     CT Abdomen Pelvis With Contrast  -     Creatinine, " Serum  -     BUN  -     CBC Auto Differential        Orders placed during this encounter include:  Orders Placed This Encounter   Procedures   • CT Abdomen Pelvis With Contrast     Order Specific Question:   Reason for Exam:     Answer:   acute abd pain, poss diverticulitis     Order Specific Question:   Will Oral Contrast be needed for this procedure?     Answer:   Yes   • Creatinine, Serum   • BUN   • CBC Auto Differential       Medications prescribed:  No orders of the defined types were placed in this encounter.    Discontinued Medications       Reason for Discontinue    omeprazole OTC (PRILOSEC OTC) 20 MG EC tablet Discontinued by another clinician        Requested Prescriptions      No prescriptions requested or ordered in this encounter       Review and/or summary of lab tests, radiology, procedures, medications. Review and summary of old records and obtaining of history. The risks and benefits of my recommendations, as well as other treatment options were discussed with the patient today. Questions were answered.    Follow-up: Return if symptoms worsen or fail to improve, for After the above.     * Surgery not found *      This document has been electronically signed by Quincy Yousif PA-C on December 15, 2017 2:36 PM      Results for orders placed or performed in visit on 12/14/17   CBC Auto Differential   Result Value Ref Range    WBC 10.58 (H) 3.20 - 9.80 10*3/mm3    RBC 5.09 4.37 - 5.74 10*6/mm3    Hemoglobin 14.9 13.7 - 17.3 g/dL    Hematocrit 43.8 39.0 - 49.0 %    MCV 86.1 80.0 - 98.0 fL    MCH 29.3 26.5 - 34.0 pg    MCHC 34.0 31.5 - 36.3 g/dL    RDW 12.1 11.5 - 14.5 %    RDW-SD 38.2 35.1 - 43.9 fl    MPV 10.5 8.0 - 12.0 fL    Platelets 247 150 - 450 10*3/mm3    Neutrophil % 56.6 37.0 - 80.0 %    Lymphocyte % 28.4 10.0 - 50.0 %    Monocyte % 12.9 (H) 0.0 - 12.0 %    Eosinophil % 1.7 0.0 - 7.0 %    Basophil % 0.2 0.0 - 2.0 %    Immature Grans % 0.2 0.0 - 0.5 %    Neutrophils, Absolute 5.98 2.00 -  8.60 10*3/mm3    Lymphocytes, Absolute 3.01 0.60 - 4.20 10*3/mm3    Monocytes, Absolute 1.37 (H) 0.00 - 0.90 10*3/mm3    Eosinophils, Absolute 0.18 0.00 - 0.70 10*3/mm3    Basophils, Absolute 0.02 0.00 - 0.20 10*3/mm3    Immature Grans, Absolute 0.02 0.00 - 0.02 10*3/mm3   BUN   Result Value Ref Range    BUN 12 7 - 21 mg/dL   Creatinine, Serum   Result Value Ref Range    Creatinine 1.24 0.70 - 1.30 mg/dL    eGFR Non  Amer 63 63 - 147 mL/min/1.73   Results for orders placed or performed in visit on 11/08/17   CBC Auto Differential   Result Value Ref Range    WBC 9.05 3.20 - 9.80 10*3/mm3    RBC 4.83 4.37 - 5.74 10*6/mm3    Hemoglobin 14.3 13.7 - 17.3 g/dL    Hematocrit 41.4 39.0 - 49.0 %    MCV 85.7 80.0 - 98.0 fL    MCH 29.6 26.5 - 34.0 pg    MCHC 34.5 31.5 - 36.3 g/dL    RDW 12.1 11.5 - 14.5 %    RDW-SD 38.3 35.1 - 43.9 fl    MPV 9.7 8.0 - 12.0 fL    Platelets 218 150 - 450 10*3/mm3    Neutrophil % 51.4 37.0 - 80.0 %    Lymphocyte % 34.6 10.0 - 50.0 %    Monocyte % 9.4 0.0 - 12.0 %    Eosinophil % 3.8 0.0 - 7.0 %    Basophil % 0.6 0.0 - 2.0 %    Immature Grans % 0.2 0.0 - 0.5 %    Neutrophils, Absolute 4.66 2.00 - 8.60 10*3/mm3    Lymphocytes, Absolute 3.13 0.60 - 4.20 10*3/mm3    Monocytes, Absolute 0.85 0.00 - 0.90 10*3/mm3    Eosinophils, Absolute 0.34 0.00 - 0.70 10*3/mm3    Basophils, Absolute 0.05 0.00 - 0.20 10*3/mm3    Immature Grans, Absolute 0.02 0.00 - 0.02 10*3/mm3   Comprehensive Metabolic Panel   Result Value Ref Range    Glucose 91 60 - 100 mg/dL    BUN 10 7 - 21 mg/dL    Creatinine 1.03 0.70 - 1.30 mg/dL    Sodium 140 137 - 145 mmol/L    Potassium 4.5 3.5 - 5.1 mmol/L    Chloride 100 95 - 110 mmol/L    CO2 27.0 22.0 - 31.0 mmol/L    Calcium 9.5 8.4 - 10.2 mg/dL    Total Protein 7.9 6.3 - 8.6 g/dL    Albumin 4.50 3.40 - 4.80 g/dL    ALT (SGPT) 51 21 - 72 U/L    AST (SGOT) 74 (H) 17 - 59 U/L    Alkaline Phosphatase 50 38 - 126 U/L    Total Bilirubin 0.3 0.2 - 1.3 mg/dL    eGFR Non African Amer  78 63 - 147 mL/min/1.73    Globulin 3.4 2.3 - 3.5 gm/dL    A/G Ratio 1.3 1.1 - 1.8 g/dL    BUN/Creatinine Ratio 9.7 7.0 - 25.0    Anion Gap 13.0 5.0 - 15.0 mmol/L   Results for orders placed or performed during the hospital encounter of 04/07/17   Tissue Exam   Result Value Ref Range    Case Report       Surgical Pathology Report                         Case: [id number]                                  Authorizing Provider:  Grover Pearson MD         Collected:           04/07/2017 02:49 PM          Ordering Location:     [facility]             Received:            04/07/2017 03:41 PM                                 [place] [facility]                                                     Pathologist:           Prashanth Odom MD                                                          Specimens:   1) - Small Intestine, Duodenum, small bowel                                                         2) - Gastric, Antrum, biopsy                                                                        3) - Esophagus, Distal, biopsy                                                                      4) - Small Intestine, Ileum, TI biopsy                                                              5) - Large Intestine, colonic mucosa biopsy                                                Final Diagnosis       1.  MUCOSA, DUODENUM:  UNREMARKABLE TALL VILLOUS MUCOSA OF THE DUODENUM.   NEGATIVE FOR PARASITES.     2.  MUCOSA, ANTRUM OF STOMACH:   CONGESTION OF ANTRAL GASTRIC MUCOSA.     3.  MUCOSA, DISTAL ESOPHAGUS:   REACTIVE CHANGE OF SQUAMOUS MUCOSA.   NEGATIVE FOR DYSPLASIA AND GOBLET CELL METAPLASIA (ALCIAN BLUE/PAS STAIN).     4.  MUCOSA, TERMINAL ILEUM:   UNREMARKABLE TALL VILLOUS MUCOSA OF THE TERMINAL ILEUM.    5.  MUCOSA, COLON:   UNREMARKABLE MUCOSA OF THE COLON.       Gross Description       Received for examination are 5 containers, each of which have nodular bits of white soft tissue measuring 0.3-0.5 cc  in aggregate.  All specimens are embedded as labeled:  1A duodenum; 2A antrum of stomach; 3A distal esophagus; 4A terminal ileum; 5A mucosa of colon.        Embedded Images         Some portions of this note have been dictated using voice recognition software and may contain errors and/or omissions.

## 2018-01-17 ENCOUNTER — OFFICE VISIT (OUTPATIENT)
Dept: GASTROENTEROLOGY | Facility: CLINIC | Age: 43
End: 2018-01-17

## 2018-01-17 VITALS
DIASTOLIC BLOOD PRESSURE: 82 MMHG | HEIGHT: 70 IN | HEART RATE: 74 BPM | WEIGHT: 225 LBS | SYSTOLIC BLOOD PRESSURE: 110 MMHG | BODY MASS INDEX: 32.21 KG/M2

## 2018-01-17 DIAGNOSIS — K21.00 GASTROESOPHAGEAL REFLUX DISEASE WITH ESOPHAGITIS: Primary | ICD-10-CM

## 2018-01-17 DIAGNOSIS — Z87.19 HISTORY OF DIVERTICULITIS: ICD-10-CM

## 2018-01-17 DIAGNOSIS — K57.30 DIVERTICULOSIS OF LARGE INTESTINE WITHOUT HEMORRHAGE: ICD-10-CM

## 2018-01-17 PROCEDURE — 99214 OFFICE O/P EST MOD 30 MIN: CPT | Performed by: PHYSICIAN ASSISTANT

## 2018-01-17 RX ORDER — MESALAMINE 800 MG/1
800 TABLET, DELAYED RELEASE ORAL 2 TIMES DAILY
Qty: 180 TABLET | Refills: 1 | Status: SHIPPED | OUTPATIENT
Start: 2018-01-17 | End: 2018-05-07 | Stop reason: RX

## 2018-01-17 RX ORDER — IBUPROFEN 800 MG/1
800 TABLET ORAL AS NEEDED
Refills: 0 | COMMUNITY
Start: 2018-01-03

## 2018-01-17 NOTE — PROGRESS NOTES
Chief Complaint   Patient presents with   • Diverticulitis       ENDO PROCEDURE ORDERED:    Subjective    Grover eLe is a 42 y.o. male. he is here today for follow-up.    History of Present Illness    Patient is seen on a recheck of his GERD, abdominal pain, and recent diverticulitis. Last seen 12/14/2017. I have reviewed with the patient and CT that he had inflammatory changes of the mid to distal sigmoid colon suggestive for diverticulitis. He had had a CBC showing elevated white count with BUN/creat slight increased creatinine of 1.24. He did complete his antibiotics and he states he is doing much better. He thinks he has had a total of 5 episodes of diverticulitis since 08/2016. He has had several bouts the last few months. He currently denies abdominal pain. GERD is well-controlled on the Dexilant. He denied nausea, vomiting, and dysphagia. Bowel movements are regular without blood or mucus. Weight is up 8.4 pounds since last visit. Last colonoscopy 04/07/2017.     ASSESSMENT/PLAN: GERD is well-controlled on Dexilant. Patient admits he is not eager to start another medication. We discussed the risks, benefits, and alternatives. I think with the bouts of colitis or diverticulitis that he has had he would benefit from a trial on Asacol 800 mg b.i.d. I discussed that we will need to follow his liver and kidney function. I suggested we try this perhaps for 1 year and see if he has any further episodes of diverticulitis or colitis; at this point would not recommend surgical resection. We will plan followup in 3 months with CBC/CMP prior. Further pending clinical course and the results of the above.          The following portions of the patient's history were reviewed and updated as appropriate:   Past Medical History:   Diagnosis Date   • Diverticulitis      Past Surgical History:   Procedure Laterality Date   • COLONOSCOPY N/A 4/7/2017    Procedure: COLONOSCOPY;  Surgeon: Grover Pearson MD;  Location:  "[facility] ENDOSCOPY;  Service:    • ENDOSCOPY N/A 4/7/2017    Procedure: ESOPHAGOGASTRODUODENOSCOPY;  Surgeon: Grover Pearson MD;  Location: [facility] ENDOSCOPY;  Service:    • HERNIA REPAIR     • KNEE ARTHROSCOPY     • UPPER GASTROINTESTINAL ENDOSCOPY  04/07/2017   • VASECTOMY       Family History   Problem Relation Age of Onset   • Diverticulitis Mother    • No Known Problems Father        No Known Allergies  Social History     Social History   • Marital status:      Spouse name: N/A   • Number of children: N/A   • Years of education: N/A     Social History Main Topics   • Smoking status: Former Smoker   • Smokeless tobacco: Current User      Comment: smokes occasionally    • Alcohol use No   • Drug use: No   • Sexual activity: Not Asked     Other Topics Concern   • None     Social History Narrative       Current Outpatient Prescriptions:   •  dexlansoprazole (DEXILANT) 60 MG capsule, Take 60 mg by mouth Daily., Disp: , Rfl:   •  ibuprofen (ADVIL,MOTRIN) 800 MG tablet, , Disp: , Rfl: 0  •  ondansetron ODT (ZOFRAN-ODT) 4 MG disintegrating tablet, Take 1 tablet by mouth Every 8 (Eight) Hours As Needed for Nausea or Vomiting., Disp: 30 tablet, Rfl: 1  •  mesalamine (ASACOL) 800 MG EC tablet, Take 1 tablet by mouth 2 (Two) Times a Day., Disp: 180 tablet, Rfl: 1  Review of Systems  Review of Systems       Objective    /82 (BP Location: Left arm)  Pulse 74  Ht 177.8 cm (70\")  Wt 102 kg (225 lb)  BMI 32.28 kg/m2  Physical Exam   Constitutional: He is oriented to person, place, and time. He appears well-developed and well-nourished. No distress.   Ill appearing   HENT:   Head: Normocephalic and atraumatic.   Eyes: EOM are normal. Pupils are equal, round, and reactive to light.   Neck: Normal range of motion.   Cardiovascular: Normal rate, regular rhythm and normal heart sounds.    Pulmonary/Chest: Effort normal and breath sounds normal.   Abdominal: Soft. Bowel sounds are normal. He exhibits no shifting " dullness, no distension, no abdominal bruit, no ascites and no mass. There is no hepatosplenomegaly. There is no tenderness. There is no rigidity, no rebound, no guarding and no CVA tenderness. No hernia. Hernia confirmed negative in the ventral area.   Musculoskeletal: Normal range of motion.   Neurological: He is alert and oriented to person, place, and time.   Skin: Skin is warm and dry.   Psychiatric: He has a normal mood and affect. His behavior is normal. Judgment and thought content normal.   Nursing note and vitals reviewed.    Assessment/Plan      1. Gastroesophageal reflux disease with esophagitis    2. Diverticulosis of large intestine without hemorrhage    3. History of diverticulitis    .   Grover was seen today for diverticulitis.    Diagnoses and all orders for this visit:    Gastroesophageal reflux disease with esophagitis  -     CBC Auto Differential; Future  -     Comprehensive Metabolic Panel; Future    Diverticulosis of large intestine without hemorrhage  -     CBC Auto Differential; Future  -     Comprehensive Metabolic Panel; Future    History of diverticulitis  -     CBC Auto Differential; Future  -     Comprehensive Metabolic Panel; Future    Other orders  -     mesalamine (ASACOL) 800 MG EC tablet; Take 1 tablet by mouth 2 (Two) Times a Day.        Orders placed during this encounter include:  Orders Placed This Encounter   Procedures   • CBC Auto Differential     Prior to follow up in April     Standing Status:   Future     Standing Expiration Date:   4/30/2018   • Comprehensive Metabolic Panel     Prior to follow up in April     Standing Status:   Future     Standing Expiration Date:   4/30/2018       Medications prescribed:  New Medications Ordered This Visit   Medications   • mesalamine (ASACOL) 800 MG EC tablet     Sig: Take 1 tablet by mouth 2 (Two) Times a Day.     Dispense:  180 tablet     Refill:  1     Discontinued Medications       Reason for Discontinue    dicyclomine (BENTYL) 10  MG capsule Therapy completed    metroNIDAZOLE (FLAGYL) 500 MG tablet Surgery        Requested Prescriptions     Signed Prescriptions Disp Refills   • mesalamine (ASACOL) 800 MG EC tablet 180 tablet 1     Sig: Take 1 tablet by mouth 2 (Two) Times a Day.       Review and/or summary of lab tests, radiology, procedures, medications. Review and summary of old records and obtaining of history. The risks and benefits of my recommendations, as well as other treatment options were discussed with the patient today. Questions were answered.    Follow-up: Return in about 3 months (around 4/17/2018), or if symptoms worsen or fail to improve, for lab prior.     * Surgery not found *      This document has been electronically signed by Quincy Yousif PA-C on January 19, 2018 10:53 AM      Results for orders placed or performed in visit on 12/14/17   CBC Auto Differential   Result Value Ref Range    WBC 10.58 (H) 3.20 - 9.80 10*3/mm3    RBC 5.09 4.37 - 5.74 10*6/mm3    Hemoglobin 14.9 13.7 - 17.3 g/dL    Hematocrit 43.8 39.0 - 49.0 %    MCV 86.1 80.0 - 98.0 fL    MCH 29.3 26.5 - 34.0 pg    MCHC 34.0 31.5 - 36.3 g/dL    RDW 12.1 11.5 - 14.5 %    RDW-SD 38.2 35.1 - 43.9 fl    MPV 10.5 8.0 - 12.0 fL    Platelets 247 150 - 450 10*3/mm3    Neutrophil % 56.6 37.0 - 80.0 %    Lymphocyte % 28.4 10.0 - 50.0 %    Monocyte % 12.9 (H) 0.0 - 12.0 %    Eosinophil % 1.7 0.0 - 7.0 %    Basophil % 0.2 0.0 - 2.0 %    Immature Grans % 0.2 0.0 - 0.5 %    Neutrophils, Absolute 5.98 2.00 - 8.60 10*3/mm3    Lymphocytes, Absolute 3.01 0.60 - 4.20 10*3/mm3    Monocytes, Absolute 1.37 (H) 0.00 - 0.90 10*3/mm3    Eosinophils, Absolute 0.18 0.00 - 0.70 10*3/mm3    Basophils, Absolute 0.02 0.00 - 0.20 10*3/mm3    Immature Grans, Absolute 0.02 0.00 - 0.02 10*3/mm3   BUN   Result Value Ref Range    BUN 12 7 - 21 mg/dL   Creatinine, Serum   Result Value Ref Range    Creatinine 1.24 0.70 - 1.30 mg/dL    eGFR Non  Amer 63 63 - 147 mL/min/1.73   Results  for orders placed or performed in visit on 11/08/17   CBC Auto Differential   Result Value Ref Range    WBC 9.05 3.20 - 9.80 10*3/mm3    RBC 4.83 4.37 - 5.74 10*6/mm3    Hemoglobin 14.3 13.7 - 17.3 g/dL    Hematocrit 41.4 39.0 - 49.0 %    MCV 85.7 80.0 - 98.0 fL    MCH 29.6 26.5 - 34.0 pg    MCHC 34.5 31.5 - 36.3 g/dL    RDW 12.1 11.5 - 14.5 %    RDW-SD 38.3 35.1 - 43.9 fl    MPV 9.7 8.0 - 12.0 fL    Platelets 218 150 - 450 10*3/mm3    Neutrophil % 51.4 37.0 - 80.0 %    Lymphocyte % 34.6 10.0 - 50.0 %    Monocyte % 9.4 0.0 - 12.0 %    Eosinophil % 3.8 0.0 - 7.0 %    Basophil % 0.6 0.0 - 2.0 %    Immature Grans % 0.2 0.0 - 0.5 %    Neutrophils, Absolute 4.66 2.00 - 8.60 10*3/mm3    Lymphocytes, Absolute 3.13 0.60 - 4.20 10*3/mm3    Monocytes, Absolute 0.85 0.00 - 0.90 10*3/mm3    Eosinophils, Absolute 0.34 0.00 - 0.70 10*3/mm3    Basophils, Absolute 0.05 0.00 - 0.20 10*3/mm3    Immature Grans, Absolute 0.02 0.00 - 0.02 10*3/mm3   Comprehensive Metabolic Panel   Result Value Ref Range    Glucose 91 60 - 100 mg/dL    BUN 10 7 - 21 mg/dL    Creatinine 1.03 0.70 - 1.30 mg/dL    Sodium 140 137 - 145 mmol/L    Potassium 4.5 3.5 - 5.1 mmol/L    Chloride 100 95 - 110 mmol/L    CO2 27.0 22.0 - 31.0 mmol/L    Calcium 9.5 8.4 - 10.2 mg/dL    Total Protein 7.9 6.3 - 8.6 g/dL    Albumin 4.50 3.40 - 4.80 g/dL    ALT (SGPT) 51 21 - 72 U/L    AST (SGOT) 74 (H) 17 - 59 U/L    Alkaline Phosphatase 50 38 - 126 U/L    Total Bilirubin 0.3 0.2 - 1.3 mg/dL    eGFR Non  Amer 78 63 - 147 mL/min/1.73    Globulin 3.4 2.3 - 3.5 gm/dL    A/G Ratio 1.3 1.1 - 1.8 g/dL    BUN/Creatinine Ratio 9.7 7.0 - 25.0    Anion Gap 13.0 5.0 - 15.0 mmol/L   Results for orders placed or performed during the hospital encounter of 04/07/17   Tissue Exam   Result Value Ref Range    Case Report       Surgical Pathology Report                         Case: [id number]                                  Authorizing Provider:  Grover Pearson MD         Collected:            04/07/2017 02:49 PM          Ordering Location:     [facility]             Received:            04/07/2017 03:41 PM                                 [place] [facility]                                                     Pathologist:           Prashanth Odom MD                                                          Specimens:   1) - Small Intestine, Duodenum, small bowel                                                         2) - Gastric, Antrum, biopsy                                                                        3) - Esophagus, Distal, biopsy                                                                      4) - Small Intestine, Ileum, TI biopsy                                                              5) - Large Intestine, colonic mucosa biopsy                                                Final Diagnosis       1.  MUCOSA, DUODENUM:  UNREMARKABLE TALL VILLOUS MUCOSA OF THE DUODENUM.   NEGATIVE FOR PARASITES.     2.  MUCOSA, ANTRUM OF STOMACH:   CONGESTION OF ANTRAL GASTRIC MUCOSA.     3.  MUCOSA, DISTAL ESOPHAGUS:   REACTIVE CHANGE OF SQUAMOUS MUCOSA.   NEGATIVE FOR DYSPLASIA AND GOBLET CELL METAPLASIA (ALCIAN BLUE/PAS STAIN).     4.  MUCOSA, TERMINAL ILEUM:   UNREMARKABLE TALL VILLOUS MUCOSA OF THE TERMINAL ILEUM.    5.  MUCOSA, COLON:   UNREMARKABLE MUCOSA OF THE COLON.       Gross Description       Received for examination are 5 containers, each of which have nodular bits of white soft tissue measuring 0.3-0.5 cc in aggregate.  All specimens are embedded as labeled:  1A duodenum; 2A antrum of stomach; 3A distal esophagus; 4A terminal ileum; 5A mucosa of colon.        Embedded Images         Some portions of this note have been dictated using voice recognition software and may contain errors and/or omissions.

## 2018-02-07 RX ORDER — DEXLANSOPRAZOLE 60 MG/1
60 CAPSULE, DELAYED RELEASE ORAL DAILY
Qty: 90 CAPSULE | Refills: 3 | Status: SHIPPED | OUTPATIENT
Start: 2018-02-07 | End: 2019-03-07 | Stop reason: SDUPTHER

## 2018-05-04 ENCOUNTER — DOCUMENTATION (OUTPATIENT)
Dept: GASTROENTEROLOGY | Facility: CLINIC | Age: 43
End: 2018-05-04

## 2018-05-04 NOTE — PROGRESS NOTES
2018, 1424 - Maame Sinha,  Medical Assistant for office of Quincy Yousif PA-C made aware Chavo with [facility], [place], [place] [phone] telephoned stating laboratory testing (Complete Blood Count and Comprehensive Metabolic Panel) as ordered per Quincy Yousif PA-C on 2018 had  and requested new orders be submitted.  Patient has clinical appointment with Quincy Yousif PA-C Monday, May 7, 2018 at 3:45 p.m. at [facility],  [place], [place].

## 2018-05-07 ENCOUNTER — OFFICE VISIT (OUTPATIENT)
Dept: GASTROENTEROLOGY | Facility: CLINIC | Age: 43
End: 2018-05-07

## 2018-05-07 VITALS
SYSTOLIC BLOOD PRESSURE: 116 MMHG | BODY MASS INDEX: 30.98 KG/M2 | WEIGHT: 216.4 LBS | DIASTOLIC BLOOD PRESSURE: 76 MMHG | HEIGHT: 70 IN | HEART RATE: 79 BPM | OXYGEN SATURATION: 98 %

## 2018-05-07 DIAGNOSIS — K21.00 GASTROESOPHAGEAL REFLUX DISEASE WITH ESOPHAGITIS: Primary | ICD-10-CM

## 2018-05-07 DIAGNOSIS — Z87.19 HISTORY OF DIVERTICULITIS: ICD-10-CM

## 2018-05-07 DIAGNOSIS — K57.30 DIVERTICULOSIS OF LARGE INTESTINE WITHOUT HEMORRHAGE: ICD-10-CM

## 2018-05-07 DIAGNOSIS — K21.00 GASTRO-ESOPHAGEAL REFLUX DISEASE WITH ESOPHAGITIS: Primary | ICD-10-CM

## 2018-05-07 LAB
ALBUMIN SERPL-MCNC: 4.4 G/DL (ref 3.2–5.5)
ALBUMIN/GLOB SERPL: 1.5 G/DL (ref 1–3)
ALP SERPL-CCNC: 43 U/L (ref 15–121)
ALT SERPL W P-5'-P-CCNC: 30 U/L (ref 10–60)
ANION GAP SERPL CALCULATED.3IONS-SCNC: 7 MMOL/L (ref 5–15)
AST SERPL-CCNC: 23 U/L (ref 10–60)
BASOPHILS # BLD AUTO: 0.02 10*3/MM3 (ref 0–0.2)
BASOPHILS NFR BLD AUTO: 0.3 % (ref 0–2)
BILIRUB SERPL-MCNC: 0.5 MG/DL (ref 0.2–1)
BUN BLD-MCNC: 11 MG/DL (ref 8–25)
BUN/CREAT SERPL: 10 (ref 7–25)
CALCIUM SPEC-SCNC: 9.1 MG/DL (ref 8.4–10.8)
CHLORIDE SERPL-SCNC: 103 MMOL/L (ref 100–112)
CO2 SERPL-SCNC: 28 MMOL/L (ref 20–32)
CREAT BLD-MCNC: 1.1 MG/DL (ref 0.4–1.3)
DEPRECATED RDW RBC AUTO: 40.9 FL (ref 35.1–43.9)
EOSINOPHIL # BLD AUTO: 0.15 10*3/MM3 (ref 0–0.7)
EOSINOPHIL NFR BLD AUTO: 2.3 % (ref 0–7)
ERYTHROCYTE [DISTWIDTH] IN BLOOD BY AUTOMATED COUNT: 12.7 % (ref 11.5–14.5)
GFR SERPL CREATININE-BSD FRML MDRD: 73 ML/MIN/1.73 (ref 63–147)
GLOBULIN UR ELPH-MCNC: 3 GM/DL (ref 2.5–4.6)
GLUCOSE BLD-MCNC: 89 MG/DL (ref 70–100)
HCT VFR BLD AUTO: 42.7 % (ref 39–49)
HGB BLD-MCNC: 14.3 G/DL (ref 13.7–17.3)
LYMPHOCYTES # BLD AUTO: 2.45 10*3/MM3 (ref 0.6–4.2)
LYMPHOCYTES NFR BLD AUTO: 37.1 % (ref 10–50)
MCH RBC QN AUTO: 29.9 PG (ref 26.5–34)
MCHC RBC AUTO-ENTMCNC: 33.5 G/DL (ref 31.5–36.3)
MCV RBC AUTO: 89.1 FL (ref 80–98)
MONOCYTES # BLD AUTO: 0.65 10*3/MM3 (ref 0–0.9)
MONOCYTES NFR BLD AUTO: 9.8 % (ref 0–12)
NEUTROPHILS # BLD AUTO: 3.33 10*3/MM3 (ref 2–8.6)
NEUTROPHILS NFR BLD AUTO: 50.5 % (ref 37–80)
PLATELET # BLD AUTO: 233 10*3/MM3 (ref 150–450)
PMV BLD AUTO: 9.4 FL (ref 8–12)
POTASSIUM BLD-SCNC: 4.3 MMOL/L (ref 3.4–5.4)
PROT SERPL-MCNC: 7.4 G/DL (ref 6.7–8.2)
RBC # BLD AUTO: 4.79 10*6/MM3 (ref 4.37–5.74)
SODIUM BLD-SCNC: 138 MMOL/L (ref 134–146)
WBC NRBC COR # BLD: 6.6 10*3/MM3 (ref 3.2–9.8)

## 2018-05-07 PROCEDURE — 80053 COMPREHEN METABOLIC PANEL: CPT | Performed by: INTERNAL MEDICINE

## 2018-05-07 PROCEDURE — 85025 COMPLETE CBC W/AUTO DIFF WBC: CPT | Performed by: INTERNAL MEDICINE

## 2018-05-07 PROCEDURE — 99214 OFFICE O/P EST MOD 30 MIN: CPT | Performed by: PHYSICIAN ASSISTANT

## 2018-05-07 PROCEDURE — 36415 COLL VENOUS BLD VENIPUNCTURE: CPT | Performed by: INTERNAL MEDICINE

## 2018-05-07 RX ORDER — MESALAMINE 1.2 G/1
2400 TABLET, DELAYED RELEASE ORAL
Qty: 60 TABLET | Refills: 5 | Status: SHIPPED | OUTPATIENT
Start: 2018-05-07 | End: 2018-11-07 | Stop reason: DRUGHIGH

## 2018-05-07 RX ORDER — MESALAMINE 1.2 G/1
1200 TABLET, DELAYED RELEASE ORAL
Qty: 30 TABLET | Refills: 5 | Status: SHIPPED | OUTPATIENT
Start: 2018-05-07 | End: 2018-05-07 | Stop reason: DRUGHIGH

## 2018-05-07 NOTE — PROGRESS NOTES
Chief Complaint   Patient presents with   • Gastroesophageal Reflux Disease With Esophagitis       ENDO PROCEDURE ORDERED:    Subjective    Grover Lee is a 42 y.o. male. he is here today for follow-up.    History of Present Illness    The patient is seen on a recheck of his GERD, and recurrent diverticulitis.  Last seen 01/17/2018.  I had tried to give him Asacol for recurrent diverticulitis.  Apparently, the insurance would not cover that, I was never notified.  He states he does take the Flagyl as needed if he feels he is getting another bout of diverticulitis, but has not had any episodes since his last acute diverticulitis in December.  In reviewing his record, I think he had another previous severe bout in February of last year.  He thinks he has had about 4 bouts of diverticulitis over the past 2 years.  He currently denies abdominal pain, heartburn, nausea or vomiting.  Bowels are moving without blood.  Weight is down 8.6 pounds since last visit.      Most recent laboratory on 05/07/2018 showed normal CBC and CMP.      ASSESSMENT AND PLAN:  Patient with recurrent diverticulitis.  I had a long discussion with the patient. I think with the frequency of his diverticulitis he would benefit from a low dose 5-ASA to see if this prevents any further episodes of diverticulitis.  I gave him Lialda 1.2 g.  He may take 2 daily.  He may try once a day to see how that does.  As long as he is doing okay, we will plan followup in 6 months with laboratory prior or sooner if he has further difficulties.         The following portions of the patient's history were reviewed and updated as appropriate:   Past Medical History:   Diagnosis Date   • Diverticulitis      Past Surgical History:   Procedure Laterality Date   • COLONOSCOPY N/A 4/7/2017    Procedure: COLONOSCOPY;  Surgeon: Grover Pearson MD;  Location: [facility] ENDOSCOPY;  Service:    • ENDOSCOPY N/A 4/7/2017    Procedure: ESOPHAGOGASTRODUODENOSCOPY;  Surgeon:  "Grover Pearson MD;  Location: [facility] ENDOSCOPY;  Service:    • HERNIA REPAIR     • KNEE ARTHROSCOPY     • UPPER GASTROINTESTINAL ENDOSCOPY  04/07/2017   • VASECTOMY       Family History   Problem Relation Age of Onset   • Diverticulitis Mother    • No Known Problems Father        No Known Allergies  Social History     Social History   • Marital status:      Social History Main Topics   • Smoking status: Former Smoker     Types: Cigarettes   • Smokeless tobacco: Current User     Types: Chew      Comment: 05/07/2018 - Patient reports when he utilized Cigarettes, he would utiize 1 pack of Cigarettes per week.  Patient reports he partakes of a Cigar occasionally, and currently utilizes chewing tobacco.   • Alcohol use No   • Drug use: No   • Sexual activity: Defer     Other Topics Concern   • Not on file       Current Outpatient Prescriptions:   •  dexlansoprazole (DEXILANT) 60 MG capsule, Take 1 capsule by mouth Daily., Disp: 90 capsule, Rfl: 3  •  ibuprofen (ADVIL,MOTRIN) 800 MG tablet, Take 800 mg by mouth As Needed., Disp: , Rfl: 0  •  ondansetron ODT (ZOFRAN-ODT) 4 MG disintegrating tablet, Take 1 tablet by mouth Every 8 (Eight) Hours As Needed for Nausea or Vomiting., Disp: 30 tablet, Rfl: 1  •  mesalamine (LIALDA) 1.2 g EC tablet, Take 2 tablets by mouth Daily With Breakfast., Disp: 60 tablet, Rfl: 5  Review of Systems  Review of Systems       Objective    /76 (BP Location: Left arm, Patient Position: Sitting, Cuff Size: Large Adult)   Pulse 79   Ht 177.8 cm (70\")   Wt 98.2 kg (216 lb 6.4 oz)   SpO2 98%   BMI 31.05 kg/m²   Physical Exam   Constitutional: He is oriented to person, place, and time. He appears well-developed and well-nourished. No distress.   HENT:   Head: Normocephalic and atraumatic.   Eyes: EOM are normal. Pupils are equal, round, and reactive to light.   Neck: Normal range of motion.   Cardiovascular: Normal rate, regular rhythm and normal heart sounds.    Pulmonary/Chest: " Effort normal and breath sounds normal.   Abdominal: Soft. Bowel sounds are normal. He exhibits no shifting dullness, no distension, no abdominal bruit, no ascites and no mass. There is no hepatosplenomegaly. There is tenderness. There is no rigidity, no rebound, no guarding and no CVA tenderness. No hernia. Hernia confirmed negative in the ventral area.   mild   Musculoskeletal: Normal range of motion.   Neurological: He is alert and oriented to person, place, and time.   Skin: Skin is warm and dry.   Psychiatric: He has a normal mood and affect. His behavior is normal. Judgment and thought content normal.   Nursing note and vitals reviewed.    Assessment/Plan      1. Gastroesophageal reflux disease with esophagitis    2. Diverticulosis of large intestine without hemorrhage    .   Grover was seen today for gastroesophageal reflux disease with esophagitis.    Diagnoses and all orders for this visit:    Gastroesophageal reflux disease with esophagitis    Diverticulosis of large intestine without hemorrhage    Other orders  -     Discontinue: mesalamine (LIALDA) 1.2 g EC tablet; Take 1 tablet by mouth Daily With Breakfast.  -     mesalamine (LIALDA) 1.2 g EC tablet; Take 2 tablets by mouth Daily With Breakfast.        Orders placed during this encounter include:  No orders of the defined types were placed in this encounter.      Medications prescribed:  New Medications Ordered This Visit   Medications   • mesalamine (LIALDA) 1.2 g EC tablet     Sig: Take 2 tablets by mouth Daily With Breakfast.     Dispense:  60 tablet     Refill:  5     Discontinued Medications       Reason for Discontinue    mesalamine (ASACOL) 800 MG EC tablet Availability        Requested Prescriptions     Signed Prescriptions Disp Refills   • mesalamine (LIALDA) 1.2 g EC tablet 60 tablet 5     Sig: Take 2 tablets by mouth Daily With Breakfast.       Review and/or summary of lab tests, radiology, procedures, medications. Review and summary of old  records and obtaining of history. The risks and benefits of my recommendations, as well as other treatment options were discussed with the patient today. Questions were answered.    Follow-up: Return in about 6 months (around 11/7/2018), or if symptoms worsen or fail to improve.     * Surgery not found *      This document has been electronically signed by Quincy Yousif PA-C on May 10, 2018 5:23 PM      Results for orders placed or performed in visit on 05/07/18   CBC Auto Differential   Result Value Ref Range    WBC 6.60 3.20 - 9.80 10*3/mm3    RBC 4.79 4.37 - 5.74 10*6/mm3    Hemoglobin 14.3 13.7 - 17.3 g/dL    Hematocrit 42.7 39.0 - 49.0 %    MCV 89.1 80.0 - 98.0 fL    MCH 29.9 26.5 - 34.0 pg    MCHC 33.5 31.5 - 36.3 g/dL    RDW 12.7 11.5 - 14.5 %    RDW-SD 40.9 35.1 - 43.9 fl    MPV 9.4 8.0 - 12.0 fL    Platelets 233 150 - 450 10*3/mm3    Neutrophil % 50.5 37.0 - 80.0 %    Lymphocyte % 37.1 10.0 - 50.0 %    Monocyte % 9.8 0.0 - 12.0 %    Eosinophil % 2.3 0.0 - 7.0 %    Basophil % 0.3 0.0 - 2.0 %    Neutrophils, Absolute 3.33 2.00 - 8.60 10*3/mm3    Lymphocytes, Absolute 2.45 0.60 - 4.20 10*3/mm3    Monocytes, Absolute 0.65 0.00 - 0.90 10*3/mm3    Eosinophils, Absolute 0.15 0.00 - 0.70 10*3/mm3    Basophils, Absolute 0.02 0.00 - 0.20 10*3/mm3   Comprehensive Metabolic Panel   Result Value Ref Range    Glucose 89 70 - 100 mg/dL    BUN 11 8 - 25 mg/dL    Creatinine 1.10 0.40 - 1.30 mg/dL    Sodium 138 134 - 146 mmol/L    Potassium 4.3 3.4 - 5.4 mmol/L    Chloride 103 100 - 112 mmol/L    CO2 28.0 20.0 - 32.0 mmol/L    Calcium 9.1 8.4 - 10.8 mg/dL    Total Protein 7.4 6.7 - 8.2 g/dL    Albumin 4.40 3.20 - 5.50 g/dL    ALT (SGPT) 30 10 - 60 U/L    AST (SGOT) 23 10 - 60 U/L    Alkaline Phosphatase 43 15 - 121 U/L    Total Bilirubin 0.5 0.2 - 1.0 mg/dL    eGFR Non  Amer 73 63 - 147 mL/min/1.73    Globulin 3.0 2.5 - 4.6 gm/dL    A/G Ratio 1.5 1.0 - 3.0 g/dL    BUN/Creatinine Ratio 10.0 7.0 - 25.0    Anion Gap  7.0 5.0 - 15.0 mmol/L   Results for orders placed or performed in visit on 12/14/17   CBC Auto Differential   Result Value Ref Range    WBC 10.58 (H) 3.20 - 9.80 10*3/mm3    RBC 5.09 4.37 - 5.74 10*6/mm3    Hemoglobin 14.9 13.7 - 17.3 g/dL    Hematocrit 43.8 39.0 - 49.0 %    MCV 86.1 80.0 - 98.0 fL    MCH 29.3 26.5 - 34.0 pg    MCHC 34.0 31.5 - 36.3 g/dL    RDW 12.1 11.5 - 14.5 %    RDW-SD 38.2 35.1 - 43.9 fl    MPV 10.5 8.0 - 12.0 fL    Platelets 247 150 - 450 10*3/mm3    Neutrophil % 56.6 37.0 - 80.0 %    Lymphocyte % 28.4 10.0 - 50.0 %    Monocyte % 12.9 (H) 0.0 - 12.0 %    Eosinophil % 1.7 0.0 - 7.0 %    Basophil % 0.2 0.0 - 2.0 %    Immature Grans % 0.2 0.0 - 0.5 %    Neutrophils, Absolute 5.98 2.00 - 8.60 10*3/mm3    Lymphocytes, Absolute 3.01 0.60 - 4.20 10*3/mm3    Monocytes, Absolute 1.37 (H) 0.00 - 0.90 10*3/mm3    Eosinophils, Absolute 0.18 0.00 - 0.70 10*3/mm3    Basophils, Absolute 0.02 0.00 - 0.20 10*3/mm3    Immature Grans, Absolute 0.02 0.00 - 0.02 10*3/mm3   BUN   Result Value Ref Range    BUN 12 7 - 21 mg/dL   Creatinine, Serum   Result Value Ref Range    Creatinine 1.24 0.70 - 1.30 mg/dL    eGFR Non  Amer 63 63 - 147 mL/min/1.73   Results for orders placed or performed in visit on 11/08/17   CBC Auto Differential   Result Value Ref Range    WBC 9.05 3.20 - 9.80 10*3/mm3    RBC 4.83 4.37 - 5.74 10*6/mm3    Hemoglobin 14.3 13.7 - 17.3 g/dL    Hematocrit 41.4 39.0 - 49.0 %    MCV 85.7 80.0 - 98.0 fL    MCH 29.6 26.5 - 34.0 pg    MCHC 34.5 31.5 - 36.3 g/dL    RDW 12.1 11.5 - 14.5 %    RDW-SD 38.3 35.1 - 43.9 fl    MPV 9.7 8.0 - 12.0 fL    Platelets 218 150 - 450 10*3/mm3    Neutrophil % 51.4 37.0 - 80.0 %    Lymphocyte % 34.6 10.0 - 50.0 %    Monocyte % 9.4 0.0 - 12.0 %    Eosinophil % 3.8 0.0 - 7.0 %    Basophil % 0.6 0.0 - 2.0 %    Immature Grans % 0.2 0.0 - 0.5 %    Neutrophils, Absolute 4.66 2.00 - 8.60 10*3/mm3    Lymphocytes, Absolute 3.13 0.60 - 4.20 10*3/mm3    Monocytes, Absolute 0.85  0.00 - 0.90 10*3/mm3    Eosinophils, Absolute 0.34 0.00 - 0.70 10*3/mm3    Basophils, Absolute 0.05 0.00 - 0.20 10*3/mm3    Immature Grans, Absolute 0.02 0.00 - 0.02 10*3/mm3   Comprehensive Metabolic Panel   Result Value Ref Range    Glucose 91 60 - 100 mg/dL    BUN 10 7 - 21 mg/dL    Creatinine 1.03 0.70 - 1.30 mg/dL    Sodium 140 137 - 145 mmol/L    Potassium 4.5 3.5 - 5.1 mmol/L    Chloride 100 95 - 110 mmol/L    CO2 27.0 22.0 - 31.0 mmol/L    Calcium 9.5 8.4 - 10.2 mg/dL    Total Protein 7.9 6.3 - 8.6 g/dL    Albumin 4.50 3.40 - 4.80 g/dL    ALT (SGPT) 51 21 - 72 U/L    AST (SGOT) 74 (H) 17 - 59 U/L    Alkaline Phosphatase 50 38 - 126 U/L    Total Bilirubin 0.3 0.2 - 1.3 mg/dL    eGFR Non  Amer 78 63 - 147 mL/min/1.73    Globulin 3.4 2.3 - 3.5 gm/dL    A/G Ratio 1.3 1.1 - 1.8 g/dL    BUN/Creatinine Ratio 9.7 7.0 - 25.0    Anion Gap 13.0 5.0 - 15.0 mmol/L     *Note: Due to a large number of results and/or encounters for the requested time period, some results have not been displayed. A complete set of results can be found in Results Review.       Some portions of this note have been dictated using voice recognition software and may contain errors and/or omissions.

## 2018-05-07 NOTE — PATIENT INSTRUCTIONS
Heartburn  Heartburn is a type of pain or discomfort that can happen in the throat or chest. It is often described as a burning pain. It may also cause a bad taste in the mouth. Heartburn may feel worse when you lie down or bend over. It may be caused by stomach contents that move back up (reflux) into the tube that connects the mouth with the stomach (esophagus).  Follow these instructions at home:  Take these actions to lessen your discomfort and to help avoid problems.  Diet   · Follow a diet as told by your doctor. You may need to avoid foods and drinks such as:  ¨ Coffee and tea (with or without caffeine).  ¨ Drinks that contain alcohol.  ¨ Energy drinks and sports drinks.  ¨ Carbonated drinks or sodas.  ¨ Chocolate and cocoa.  ¨ Peppermint and mint flavorings.  ¨ Garlic and onions.  ¨ Horseradish.  ¨ Spicy and acidic foods, such as peppers, chili powder, lima powder, vinegar, hot sauces, and BBQ sauce.  ¨ Citrus fruit juices and citrus fruits, such as oranges, trini, and limes.  ¨ Tomato-based foods, such as red sauce, chili, salsa, and pizza with red sauce.  ¨ Fried and fatty foods, such as donuts, french fries, potato chips, and high-fat dressings.  ¨ High-fat meats, such as hot dogs, rib eye steak, sausage, ham, and evans.  ¨ High-fat dairy items, such as whole milk, butter, and cream cheese.  · Eat small meals often. Avoid eating large meals.  · Avoid drinking large amounts of liquid with your meals.  · Avoid eating meals during the 2-3 hours before bedtime.  · Avoid lying down right after you eat.  · Do not exercise right after you eat.  General instructions   · Pay attention to any changes in your symptoms.  · Take over-the-counter and prescription medicines only as told by your doctor. Do not take aspirin, ibuprofen, or other NSAIDs unless your doctor says it is okay.  · Do not use any tobacco products, including cigarettes, chewing tobacco, and e-cigarettes. If you need help quitting, ask your  doctor.  · Wear loose clothes. Do not wear anything tight around your waist.  · Raise (elevate) the head of your bed about 6 inches (15 cm).  · Try to lower your stress. If you need help doing this, ask your doctor.  · If you are overweight, lose an amount of weight that is healthy for you. Ask your doctor about a safe weight loss goal.  · Keep all follow-up visits as told by your doctor. This is important.  Contact a doctor if:  · You have new symptoms.  · You lose weight and you do not know why it is happening.  · You have trouble swallowing, or it hurts to swallow.  · You have wheezing or a cough that keeps happening.  · Your symptoms do not get better with treatment.  · You have heartburn often for more than two weeks.  Get help right away if:  · You have pain in your arms, neck, jaw, teeth, or back.  · You feel sweaty, dizzy, or light-headed.  · You have chest pain or shortness of breath.  · You throw up (vomit) and your throw up looks like blood or coffee grounds.  · Your poop (stool) is bloody or black.  This information is not intended to replace advice given to you by your health care provider. Make sure you discuss any questions you have with your health care provider.  Document Released: 08/29/2012 Document Revised: 05/25/2017 Document Reviewed: 04/13/2016  [employer] Interactive Patient Education © 2017 [employer]  MyPlate from [employer]  The general, healthful diet is based on the 2010 Dietary Guidelines for Americans. The amount of food you need to eat from each food group depends on your age, sex, and level of physical activity and can be individualized by a dietitian. Go to ChooseMyPlate.gov for more information.  What do I need to know about the MyPlate plan?  · Enjoy your food, but eat less.  · Avoid oversized portions.  ¨ ½ of your plate should include fruits and vegetables.  ¨ ¼ of your plate should be grains.  ¨ ¼ of your plate should be protein.  Grains   · Make at least half of your grains whole  grains.  · For a 2,000 calorie daily food plan, eat 6 oz every day.  · 1 oz is about 1 slice bread, 1 cup cereal, or ½ cup cooked rice, cereal, or pasta.  Vegetables   · Make half your plate fruits and vegetables.  · For a 2,000 calorie daily food plan, eat 2½ cups every day.  · 1 cup is about 1 cup raw or cooked vegetables or vegetable juice or 2 cups raw leafy greens.  Fruits   · Make half your plate fruits and vegetables.  · For a 2,000 calorie daily food plan, eat 2 cups every day.  · 1 cup is about 1 cup fruit or 100% fruit juice or ½ cup dried fruit.  Protein   · For a 2,000 calorie daily food plan, eat 5½ oz every day.  · 1 oz is about 1 oz meat, poultry, or fish, ¼ cup cooked beans, 1 egg, 1 Tbsp peanut butter, or ½ oz nuts or seeds.  Dairy   · Switch to fat-free or low-fat (1%) milk.  · For a 2,000 calorie daily food plan, eat 3 cups every day.  · 1 cup is about 1 cup milk or yogurt or soy milk (soy beverage), 1½ oz natural cheese, or 2 oz processed cheese.  Fats, Oils, and Empty Calories   · Only small amounts of oils are recommended.  · Empty calories are calories from solid fats or added sugars.  · Compare sodium in foods like soup, bread, and frozen meals. Choose the foods with lower numbers.  · Drink water instead of sugary drinks.  What foods can I eat?  Grains   Whole grains such as whole wheat, quinoa, millet, and bulgur. Bread, rolls, and pasta made from whole grains. Brown or wild rice. Hot or cold cereals made from whole grains and without added sugar.  Vegetables   All fresh vegetables, especially fresh red, dark green, or orange vegetables. Peas and beans. Low-sodium frozen or canned vegetables prepared without added salt. Low-sodium vegetable juices.  Fruits   All fresh, frozen, and dried fruits. Canned fruit packed in water or fruit juice without added sugar. Fruit juices without added sugar.  Meats and Other Protein Sources   Boiled, baked, or grilled lean meat trimmed of fat. Skinless  poultry. Fresh seafood and shellfish. Canned seafood packed in water. Unsalted nuts and unsalted nut butters. Tofu. Dried beans and pea. Eggs.  Dairy   Low-fat or fat-free milk, yogurt, and cheeses.  Sweets and Desserts   Frozen desserts made from low-fat milk.  Fats and Oils   Olive, peanut, and canola oils and margarine. Salad dressing and mayonnaise made from these oils.  Other   Soups and casseroles made from allowed ingredients and without added fat or salt.  The items listed above may not be a complete list of recommended foods or beverages. Contact your dietitian for more options.   What foods are not recommended?  Grains   Sweetened, low-fiber cereals. Packaged baked goods. Snack crackers and chips. Cheese crackers, butter crackers, and biscuits. Frozen waffles, sweet breads, doughnuts, pastries, packaged baking mixes, pancakes, cakes, and cookies.  Vegetables   Regular canned or frozen vegetables or vegetables prepared with salt. Canned tomatoes. Canned tomato sauce. Fried vegetables. Vegetables in cream sauce or cheese sauce.  Fruits   Fruits packed in syrup or made with added sugar.  Meats and Other Protein Sources   Marbled or fatty meats such as ribs. Poultry with skin. Fried meats, poultry, eggs, or fish. Sausages, hot dogs, and deli meats such as pastrami, bologna, or salami.  Dairy   Whole milk, cream, cheeses made from whole milk, sour cream. Ice cream or yogurt made from whole milk or with added sugar.  Beverages   For adults, no more than one alcoholic drink per day. Regular soft drinks or other sugary beverages. Juice drinks.  Sweets and Desserts   Sugary or fatty desserts, candy, and other sweets.  Fats and Oils   Solid shortening or partially hydrogenated oils. Solid margarine. Margarine that contains trans fats. Butter.  The items listed above may not be a complete list of foods and beverages to avoid. Contact your dietitian for more information.   This information is not intended to replace  advice given to you by your health care provider. Make sure you discuss any questions you have with your health care provider.  Document Released: 01/06/2009 Document Revised: 05/25/2017 Document Reviewed: 11/26/2014  [employer] Interactive Patient Education © 2017 [employer]  BMI for Adults  Body mass index (BMI) is a number that is calculated from a person's weight and height. In most adults, the number is used to find how much of an adult's weight is made up of fat. BMI is not as accurate as a direct measure of body fat.  How is BMI calculated?  BMI is calculated by dividing weight in kilograms by height in meters squared. It can also be calculated by dividing weight in pounds by height in inches squared, then multiplying the resulting number by 703. Charts are available to help you find your BMI quickly and easily without doing this calculation.  How is BMI interpreted?  Health care professionals use BMI charts to identify whether an adult is underweight, at a normal weight, or overweight based on the following guidelines:  · Underweight: BMI less than 18.5.  · Normal weight: BMI between 18.5 and 24.9.  · Overweight: BMI between 25 and 29.9.  · Obese: BMI of 30 and above.  BMI is usually interpreted the same for males and females.  Weight includes both fat and muscle, so someone with a muscular build, such as an athlete, may have a BMI that is higher than 24.9. In cases like these, BMI may not accurately depict body fat. To determine if excess body fat is the cause of a BMI of 25 or higher, further assessments may need to be done by a health care provider.  Why is BMI a useful tool?  BMI is used to identify a possible weight problem that may be related to a medical problem or may increase the risk for medical problems. BMI can also be used to promote changes to reach a healthy weight.  This information is not intended to replace advice given to you by your health care provider. Make sure you discuss any  questions you have with your health care provider.  Document Released: 08/29/2005 Document Revised: 04/27/2017 Document Reviewed: 05/15/2015  [employer] Interactive Patient Education © 2017 [employer]

## 2018-06-04 ENCOUNTER — HOSPITAL ENCOUNTER (EMERGENCY)
Facility: HOSPITAL | Age: 43
Discharge: HOME | End: 2018-06-04
Attending: EMERGENCY MEDICINE | Admitting: EMERGENCY MEDICINE

## 2018-06-04 ENCOUNTER — APPOINTMENT (OUTPATIENT)
Dept: GENERAL RADIOLOGY | Facility: HOSPITAL | Age: 43
End: 2018-06-04

## 2018-06-04 VITALS
WEIGHT: 220 LBS | RESPIRATION RATE: 18 BRPM | OXYGEN SATURATION: 96 % | BODY MASS INDEX: 31.5 KG/M2 | DIASTOLIC BLOOD PRESSURE: 78 MMHG | HEART RATE: 61 BPM | TEMPERATURE: 98 F | HEIGHT: 70 IN | SYSTOLIC BLOOD PRESSURE: 113 MMHG

## 2018-06-04 DIAGNOSIS — R07.9 CHEST PAIN, UNSPECIFIED TYPE: Primary | ICD-10-CM

## 2018-06-04 LAB
ALBUMIN SERPL-MCNC: 4.4 G/DL (ref 3.4–4.8)
ALBUMIN/GLOB SERPL: 1.5 G/DL (ref 1.1–1.8)
ALP SERPL-CCNC: 45 U/L (ref 38–126)
ALT SERPL W P-5'-P-CCNC: 50 U/L (ref 21–72)
ANION GAP SERPL CALCULATED.3IONS-SCNC: 11 MMOL/L (ref 5–15)
AST SERPL-CCNC: 35 U/L (ref 17–59)
BASOPHILS # BLD AUTO: 0.01 10*3/MM3 (ref 0–0.2)
BASOPHILS NFR BLD AUTO: 0.1 % (ref 0–2)
BILIRUB SERPL-MCNC: 0.4 MG/DL (ref 0.2–1.3)
BUN BLD-MCNC: 11 MG/DL (ref 7–21)
BUN/CREAT SERPL: 11.6 (ref 7–25)
CALCIUM SPEC-SCNC: 9 MG/DL (ref 8.4–10.2)
CHLORIDE SERPL-SCNC: 105 MMOL/L (ref 95–110)
CO2 SERPL-SCNC: 25 MMOL/L (ref 22–31)
CREAT BLD-MCNC: 0.95 MG/DL (ref 0.7–1.3)
DEPRECATED RDW RBC AUTO: 40 FL (ref 35.1–43.9)
EOSINOPHIL # BLD AUTO: 0.12 10*3/MM3 (ref 0–0.7)
EOSINOPHIL NFR BLD AUTO: 1.7 % (ref 0–7)
ERYTHROCYTE [DISTWIDTH] IN BLOOD BY AUTOMATED COUNT: 12.5 % (ref 11.5–14.5)
GFR SERPL CREATININE-BSD FRML MDRD: 86 ML/MIN/1.73 (ref 63–147)
GLOBULIN UR ELPH-MCNC: 3 GM/DL (ref 2.3–3.5)
GLUCOSE BLD-MCNC: 93 MG/DL (ref 60–100)
HCT VFR BLD AUTO: 42.8 % (ref 39–49)
HGB BLD-MCNC: 14.9 G/DL (ref 13.7–17.3)
HOLD SPECIMEN: NORMAL
HOLD SPECIMEN: NORMAL
IMM GRANULOCYTES # BLD: 0.01 10*3/MM3 (ref 0–0.02)
IMM GRANULOCYTES NFR BLD: 0.1 % (ref 0–0.5)
INR PPP: 1.01 (ref 0.8–1.2)
LYMPHOCYTES # BLD AUTO: 2.04 10*3/MM3 (ref 0.6–4.2)
LYMPHOCYTES NFR BLD AUTO: 29 % (ref 10–50)
MCH RBC QN AUTO: 30 PG (ref 26.5–34)
MCHC RBC AUTO-ENTMCNC: 34.8 G/DL (ref 31.5–36.3)
MCV RBC AUTO: 86.3 FL (ref 80–98)
MONOCYTES # BLD AUTO: 0.6 10*3/MM3 (ref 0–0.9)
MONOCYTES NFR BLD AUTO: 8.5 % (ref 0–12)
NEUTROPHILS # BLD AUTO: 4.25 10*3/MM3 (ref 2–8.6)
NEUTROPHILS NFR BLD AUTO: 60.6 % (ref 37–80)
NT-PROBNP SERPL-MCNC: 33.2 PG/ML (ref 0–450)
PLATELET # BLD AUTO: 227 10*3/MM3 (ref 150–450)
PMV BLD AUTO: 9.6 FL (ref 8–12)
POTASSIUM BLD-SCNC: 4.1 MMOL/L (ref 3.5–5.1)
PROT SERPL-MCNC: 7.4 G/DL (ref 6.3–8.6)
PROTHROMBIN TIME: 13.1 SECONDS (ref 11.1–15.3)
RBC # BLD AUTO: 4.96 10*6/MM3 (ref 4.37–5.74)
SODIUM BLD-SCNC: 141 MMOL/L (ref 137–145)
TROPONIN I SERPL-MCNC: <0.012 NG/ML
WBC NRBC COR # BLD: 7.03 10*3/MM3 (ref 3.2–9.8)
WHOLE BLOOD HOLD SPECIMEN: NORMAL
WHOLE BLOOD HOLD SPECIMEN: NORMAL

## 2018-06-04 PROCEDURE — 84484 ASSAY OF TROPONIN QUANT: CPT | Performed by: PHYSICIAN ASSISTANT

## 2018-06-04 PROCEDURE — 85025 COMPLETE CBC W/AUTO DIFF WBC: CPT | Performed by: PHYSICIAN ASSISTANT

## 2018-06-04 PROCEDURE — 99284 EMERGENCY DEPT VISIT MOD MDM: CPT

## 2018-06-04 PROCEDURE — 83880 ASSAY OF NATRIURETIC PEPTIDE: CPT | Performed by: PHYSICIAN ASSISTANT

## 2018-06-04 PROCEDURE — 93005 ELECTROCARDIOGRAM TRACING: CPT | Performed by: EMERGENCY MEDICINE

## 2018-06-04 PROCEDURE — 71045 X-RAY EXAM CHEST 1 VIEW: CPT

## 2018-06-04 PROCEDURE — 93010 ELECTROCARDIOGRAM REPORT: CPT | Performed by: INTERNAL MEDICINE

## 2018-06-04 PROCEDURE — 85610 PROTHROMBIN TIME: CPT | Performed by: PHYSICIAN ASSISTANT

## 2018-06-04 PROCEDURE — 80053 COMPREHEN METABOLIC PANEL: CPT | Performed by: PHYSICIAN ASSISTANT

## 2018-06-04 RX ORDER — SODIUM CHLORIDE 0.9 % (FLUSH) 0.9 %
10 SYRINGE (ML) INJECTION AS NEEDED
Status: DISCONTINUED | OUTPATIENT
Start: 2018-06-04 | End: 2018-06-04 | Stop reason: HOSPADM

## 2018-06-04 RX ORDER — ASPIRIN 325 MG
325 TABLET ORAL ONCE
Status: COMPLETED | OUTPATIENT
Start: 2018-06-04 | End: 2018-06-04

## 2018-06-04 RX ADMIN — Medication 10 ML: at 09:45

## 2018-06-04 RX ADMIN — ASPIRIN 325 MG: 325 TABLET ORAL at 09:45

## 2018-06-04 NOTE — ED PROVIDER NOTES
Subjective   Patient presents to emergency department for chest pain/pressure since about 730am.  States he was at work and experienced chest pain, nausea, and diaphoresis.  Endorses skipping breakfast.  No past cardiac history or family history of early cardiac problems.          History provided by:  Patient   used: No    Chest Pain   Pain location:  L chest and R chest  Pain quality: pressure and sharp    Pain radiates to:  Does not radiate  Pain severity:  Moderate  Onset quality:  Sudden  Duration:  1 hour  Timing:  Constant  Progression:  Resolved  Chronicity:  New  Context: at rest    Context: not breathing and not movement    Relieved by:  Nothing  Worsened by:  Nothing  Ineffective treatments:  None tried  Associated symptoms: anxiety, diaphoresis and nausea    Associated symptoms: no abdominal pain, no altered mental status, no anorexia, no back pain, no claudication, no cough, no dizziness, no dysphagia, no fatigue, no fever, no headache, no heartburn, no lower extremity edema, no near-syncope, no numbness, no orthopnea, no palpitations, no PND, no shortness of breath, no syncope, no vomiting and no weakness    Risk factors: male sex    Risk factors: no coronary artery disease, no diabetes mellitus, no high cholesterol, no hypertension, not obese, no prior DVT/PE and no smoking        Review of Systems   Constitutional: Positive for diaphoresis. Negative for fatigue and fever.   HENT: Negative for trouble swallowing.    Eyes: Negative for visual disturbance.   Respiratory: Positive for chest tightness. Negative for cough and shortness of breath.    Cardiovascular: Positive for chest pain. Negative for palpitations, orthopnea, claudication, syncope, PND and near-syncope.   Gastrointestinal: Positive for nausea. Negative for abdominal pain, anorexia, heartburn and vomiting.   Genitourinary: Negative for dysuria and flank pain.   Musculoskeletal: Negative for back pain.   Skin: Negative  "for color change.   Allergic/Immunologic: Negative for immunocompromised state.   Neurological: Negative for dizziness, weakness, numbness and headaches.   Hematological: Does not bruise/bleed easily.   Psychiatric/Behavioral: Negative for confusion.       Past Medical History:   Diagnosis Date   • Diverticulitis    • GERD (gastroesophageal reflux disease)    • Pleurisy        No Known Allergies    Past Surgical History:   Procedure Laterality Date   • COLONOSCOPY N/A 4/7/2017    Procedure: COLONOSCOPY;  Surgeon: Grover Pearson MD;  Location: [facility] ENDOSCOPY;  Service:    • ENDOSCOPY N/A 4/7/2017    Procedure: ESOPHAGOGASTRODUODENOSCOPY;  Surgeon: Grover Pearson MD;  Location: [facility] ENDOSCOPY;  Service:    • HERNIA REPAIR     • KNEE ARTHROSCOPY     • UPPER GASTROINTESTINAL ENDOSCOPY  04/07/2017   • VASECTOMY         Family History   Problem Relation Age of Onset   • Diverticulitis Mother    • No Known Problems Father        Social History     Social History   • Marital status:      Social History Main Topics   • Smoking status: Former Smoker     Types: Cigarettes   • Smokeless tobacco: Current User     Types: Chew      Comment: 05/07/2018 - Patient reports when he utilized Cigarettes, he would utiize 1 pack of Cigarettes per week.  Patient reports he partakes of a Cigar occasionally, and currently utilizes chewing tobacco.   • Alcohol use No   • Drug use: No   • Sexual activity: Defer     Other Topics Concern   • Not on file           Objective      /78 (Patient Position: Lying)   Pulse 61   Temp 98 °F (36.7 °C) (Oral)   Resp 18   Ht 177.8 cm (70\")   Wt 99.8 kg (220 lb)   SpO2 96%   BMI 31.57 kg/m²     Physical Exam   Constitutional: He is oriented to person, place, and time. He appears well-developed and well-nourished.   HENT:   Head: Normocephalic and atraumatic.   Eyes: Conjunctivae are normal.   Cardiovascular: Normal rate, regular rhythm and normal heart sounds.    Pulmonary/Chest: " Effort normal and breath sounds normal. No respiratory distress. He has no wheezes.   Abdominal: Soft. He exhibits no distension. There is no tenderness.   Musculoskeletal: He exhibits no tenderness.   Neurological: He is alert and oriented to person, place, and time.   Skin: Skin is warm. Capillary refill takes less than 2 seconds.   Psychiatric: He has a normal mood and affect. His behavior is normal.   Nursing note and vitals reviewed.      ECG 12 Lead    Date/Time: 6/4/2018 10:04 AM  Performed by: JESSICA CULLEN  Authorized by: PAU REIS   Interpreted by physician  Comparison: not compared with previous ECG   Rhythm: sinus rhythm  Rate: normal  BPM: 68  ST Segments: ST segments normal  Clinical impression: normal ECG                   ED Course      Results for orders placed or performed during the hospital encounter of 06/04/18   Troponin   Result Value Ref Range    Troponin I <0.012 <=0.034 ng/mL   Comprehensive Metabolic Panel   Result Value Ref Range    Glucose 93 60 - 100 mg/dL    BUN 11 7 - 21 mg/dL    Creatinine 0.95 0.70 - 1.30 mg/dL    Sodium 141 137 - 145 mmol/L    Potassium 4.1 3.5 - 5.1 mmol/L    Chloride 105 95 - 110 mmol/L    CO2 25.0 22.0 - 31.0 mmol/L    Calcium 9.0 8.4 - 10.2 mg/dL    Total Protein 7.4 6.3 - 8.6 g/dL    Albumin 4.40 3.40 - 4.80 g/dL    ALT (SGPT) 50 21 - 72 U/L    AST (SGOT) 35 17 - 59 U/L    Alkaline Phosphatase 45 38 - 126 U/L    Total Bilirubin 0.4 0.2 - 1.3 mg/dL    eGFR Non  Amer 86 63 - 147 mL/min/1.73    Globulin 3.0 2.3 - 3.5 gm/dL    A/G Ratio 1.5 1.1 - 1.8 g/dL    BUN/Creatinine Ratio 11.6 7.0 - 25.0    Anion Gap 11.0 5.0 - 15.0 mmol/L   BNP   Result Value Ref Range    proBNP 33.2 0.0 - 450.0 pg/mL   Protime-INR   Result Value Ref Range    Protime 13.1 11.1 - 15.3 Seconds    INR 1.01 0.80 - 1.20   CBC Auto Differential   Result Value Ref Range    WBC 7.03 3.20 - 9.80 10*3/mm3    RBC 4.96 4.37 - 5.74 10*6/mm3    Hemoglobin 14.9 13.7 - 17.3 g/dL     Hematocrit 42.8 39.0 - 49.0 %    MCV 86.3 80.0 - 98.0 fL    MCH 30.0 26.5 - 34.0 pg    MCHC 34.8 31.5 - 36.3 g/dL    RDW 12.5 11.5 - 14.5 %    RDW-SD 40.0 35.1 - 43.9 fl    MPV 9.6 8.0 - 12.0 fL    Platelets 227 150 - 450 10*3/mm3    Neutrophil % 60.6 37.0 - 80.0 %    Lymphocyte % 29.0 10.0 - 50.0 %    Monocyte % 8.5 0.0 - 12.0 %    Eosinophil % 1.7 0.0 - 7.0 %    Basophil % 0.1 0.0 - 2.0 %    Immature Grans % 0.1 0.0 - 0.5 %    Neutrophils, Absolute 4.25 2.00 - 8.60 10*3/mm3    Lymphocytes, Absolute 2.04 0.60 - 4.20 10*3/mm3    Monocytes, Absolute 0.60 0.00 - 0.90 10*3/mm3    Eosinophils, Absolute 0.12 0.00 - 0.70 10*3/mm3    Basophils, Absolute 0.01 0.00 - 0.20 10*3/mm3    Immature Grans, Absolute 0.01 0.00 - 0.02 10*3/mm3   Light Blue Top   Result Value Ref Range    Extra Tube hold for add-on    Green Top (Gel)   Result Value Ref Range    Extra Tube Hold for add-ons.    Lavender Top   Result Value Ref Range    Extra Tube hold for add-on    Gold Top - SST   Result Value Ref Range    Extra Tube Hold for add-ons.      Xr Chest 1 View    Result Date: 6/4/2018  Narrative: Chest single view. CLINICAL INDICATION: Chest pain protocol. COMPARISON: None FINDINGS: Cardiac silhouette is normal in size. Pulmonary vascularity is unremarkable. No focal infiltrate or consolidation.  No pleural effusion.  No pneumothorax.     Impression: CONCLUSION: No evidence of active disease. Normal chest. Electronically signed by:  Tyler Bales MD  6/4/2018 10:07 AM CDT Workstation: [id number]            HEART Score (for prediction of 6-week risk of major adverse cardiac event) reviewed and/or performed as part of the patient evaluation and treatment planning process.  The result associated with this review/performance is: 0       MDM      Final diagnoses:   Chest pain, unspecified type            Malachi Daniels PA-C  06/04/18 1830

## 2018-11-07 ENCOUNTER — OFFICE VISIT (OUTPATIENT)
Dept: GASTROENTEROLOGY | Facility: CLINIC | Age: 43
End: 2018-11-07

## 2018-11-07 VITALS
WEIGHT: 220.8 LBS | HEART RATE: 75 BPM | SYSTOLIC BLOOD PRESSURE: 112 MMHG | DIASTOLIC BLOOD PRESSURE: 88 MMHG | HEIGHT: 70 IN | BODY MASS INDEX: 31.61 KG/M2

## 2018-11-07 DIAGNOSIS — K21.00 GASTRO-ESOPHAGEAL REFLUX DISEASE WITH ESOPHAGITIS: ICD-10-CM

## 2018-11-07 DIAGNOSIS — K57.30 DIVERTICULOSIS OF LARGE INTESTINE WITHOUT HEMORRHAGE: Primary | ICD-10-CM

## 2018-11-07 DIAGNOSIS — Z87.19 HISTORY OF DIVERTICULITIS: ICD-10-CM

## 2018-11-07 PROCEDURE — 99214 OFFICE O/P EST MOD 30 MIN: CPT | Performed by: PHYSICIAN ASSISTANT

## 2018-11-07 RX ORDER — LORAZEPAM 1 MG/1
TABLET ORAL
Refills: 1 | COMMUNITY
Start: 2018-10-05

## 2018-11-07 RX ORDER — MESALAMINE 1.2 G/1
4800 TABLET, DELAYED RELEASE ORAL
Qty: 120 TABLET | Refills: 2 | Status: SHIPPED | OUTPATIENT
Start: 2018-11-07 | End: 2019-03-07 | Stop reason: SDDI

## 2018-11-07 RX ORDER — METRONIDAZOLE 500 MG/1
500 TABLET ORAL 2 TIMES DAILY PRN
COMMUNITY
End: 2019-07-09 | Stop reason: SDUPTHER

## 2018-11-07 NOTE — PROGRESS NOTES
Chief Complaint   Patient presents with   • Heartburn   • Diverticulosis       ENDO PROCEDURE ORDERED:    Subjective    Grover Lee is a 43 y.o. male. he is here today for follow-up.    History of Present Illness    The patient is seen on a recheck of his GERD, recurrent diverticulitis. Last seen 05/07/2018, I had refilled his Lialda for his recurrent diverticulitis. He states that he is still taking 2 a day, he is still having some episodes he thinks of mild diverticulitis. Usually they start, he will have an urgency to urinate and has increasing pain. He does have Flagyl on-hand and he takes it when he feels a flare coming on. He thinks his most recent episode was about 2 weeks ago. It did see seem to get better after a few days starting the antibiotic. I had asked him to notify me, but he did not do so. He thinks his prior episode was in 08/2018. He does not think that the Lialda has particularly helped decrease the frequency of his episodes, but possibly has reduced some of the severity. However, he has been taking the antibiotic when needed. His weight is up 4.2 pounds since last visit. Last colonoscopy was 07/07/2017. GERD is well controlled on the Dexilant 60 mg daily. He denied nausea and vomiting.    Laboratory from last visit on 05/07/2018 showed normal CBC and CMP.    Studies on 06/04/2018, chest x-ray was negative. INR was 1.01. Negative BMP. Normal CBC, CMP, negative cardiac enzymes.    ASSESSMENT/PLAN: Patient with probable recurrent bouts of diverticulitis in the sigmoid, previously proved on CT imaging. I suggested trying to increase his Lialda to 4 daily to see if this might help him further. I did suggest surgical consultation with Dr. Mason. We discussed the risks, benefits, alternatives. I am concerned that he keeps having bouts of diverticulitis, he states it would be easier for him to have surgery in the winter if needed. I suggested followup in a few months with repeat laboratories  prior. He is agreeable.        The following portions of the patient's history were reviewed and updated as appropriate:   Past Medical History:   Diagnosis Date   • Diverticulitis    • GERD (gastroesophageal reflux disease)    • Pleurisy      Past Surgical History:   Procedure Laterality Date   • HERNIA REPAIR     • KNEE ARTHROSCOPY     • UPPER GASTROINTESTINAL ENDOSCOPY  04/07/2017   • VASECTOMY       Family History   Problem Relation Age of Onset   • Diverticulitis Mother    • No Known Problems Father        No Known Allergies  Social History     Socioeconomic History   • Marital status:      Spouse name: Not on file   • Number of children: Not on file   • Years of education: Not on file   • Highest education level: Not on file   Tobacco Use   • Smoking status: Former Smoker     Types: Cigarettes   • Smokeless tobacco: Current User     Types: Chew   • Tobacco comment: 05/07/2018 - Patient reports when he utilized Cigarettes, he would utiize 1 pack of Cigarettes per week.  Patient reports he partakes of a Cigar occasionally, and currently utilizes chewing tobacco.   Substance and Sexual Activity   • Alcohol use: No   • Drug use: No   • Sexual activity: Defer       Current Outpatient Medications:   •  dexlansoprazole (DEXILANT) 60 MG capsule, Take 1 capsule by mouth Daily., Disp: 90 capsule, Rfl: 3  •  ibuprofen (ADVIL,MOTRIN) 800 MG tablet, Take 800 mg by mouth As Needed., Disp: , Rfl: 0  •  LORazepam (ATIVAN) 1 MG tablet, TAKE 1 TABLET BY MOUTH THREE TIMES A DAY OR LESS FOR NERVOUSNESS, Disp: , Rfl: 1  •  metroNIDAZOLE (FLAGYL) 500 MG tablet, Take 500 mg by mouth 2 (Two) Times a Day As Needed., Disp: , Rfl:   •  ondansetron ODT (ZOFRAN-ODT) 4 MG disintegrating tablet, Take 1 tablet by mouth Every 8 (Eight) Hours As Needed for Nausea or Vomiting., Disp: 30 tablet, Rfl: 1  •  mesalamine (LIALDA) 1.2 g EC tablet, Take 4 tablets by mouth Daily With Breakfast., Disp: 120 tablet, Rfl: 2  Review of  "Systems  Review of Systems       Objective    /88 (BP Location: Left arm)   Pulse 75   Ht 177.8 cm (70\")   Wt 100 kg (220 lb 12.8 oz)   BMI 31.68 kg/m²   Physical Exam   Constitutional: He is oriented to person, place, and time. He appears well-developed and well-nourished. No distress.   HENT:   Head: Normocephalic and atraumatic.   Eyes: Pupils are equal, round, and reactive to light. EOM are normal.   Neck: Normal range of motion.   Cardiovascular: Normal rate, regular rhythm and normal heart sounds.    Pulmonary/Chest: Effort normal and breath sounds normal.   Abdominal: Soft. Bowel sounds are normal. He exhibits no shifting dullness, no distension, no abdominal bruit, no ascites and no mass. There is no hepatosplenomegaly. There is tenderness. There is no rigidity, no rebound, no guarding and no CVA tenderness. No hernia. Hernia confirmed negative in the ventral area.   mild   Musculoskeletal: Normal range of motion.   Neurological: He is alert and oriented to person, place, and time.   Skin: Skin is warm and dry.   Psychiatric: He has a normal mood and affect. His behavior is normal. Judgment and thought content normal.   Nursing note and vitals reviewed.    Assessment/Plan      1. Diverticulosis of large intestine without hemorrhage    2. History of diverticulitis    3. Gastro-esophageal reflux disease with esophagitis    .   Grover was seen today for heartburn and diverticulosis.    Diagnoses and all orders for this visit:    Diverticulosis of large intestine without hemorrhage  -     CBC & Differential; Future  -     Comprehensive Metabolic Panel; Future  -     Ambulatory Referral to General Surgery    History of diverticulitis  -     CBC & Differential; Future  -     Comprehensive Metabolic Panel; Future  -     Ambulatory Referral to General Surgery    Gastro-esophageal reflux disease with esophagitis  -     CBC & Differential; Future  -     Comprehensive Metabolic Panel; Future    Other orders  -  "    mesalamine (LIALDA) 1.2 g EC tablet; Take 4 tablets by mouth Daily With Breakfast.        Orders placed during this encounter include:  Orders Placed This Encounter   Procedures   • Comprehensive Metabolic Panel     Standing Status:   Future     Standing Expiration Date:   5/6/2019   • Ambulatory Referral to General Surgery     Referral Priority:   Routine     Referral Type:   Consultation     Referral Reason:   Specialty Services Required     Referred to Provider:   Tylor Mason MD     Requested Specialty:   General Surgery     Number of Visits Requested:   1   • CBC & Differential     Standing Status:   Future     Standing Expiration Date:   5/6/2019     Order Specific Question:   Manual Differential     Answer:   No       Medications prescribed:  New Medications Ordered This Visit   Medications   • mesalamine (LIALDA) 1.2 g EC tablet     Sig: Take 4 tablets by mouth Daily With Breakfast.     Dispense:  120 tablet     Refill:  2       Requested Prescriptions     Signed Prescriptions Disp Refills   • mesalamine (LIALDA) 1.2 g EC tablet 120 tablet 2     Sig: Take 4 tablets by mouth Daily With Breakfast.       Review and/or summary of lab tests, radiology, procedures, medications. Review and summary of old records and obtaining of history. The risks and benefits of my recommendations, as well as other treatment options were discussed with the patient today. Questions were answered.    Follow-up: Return in about 4 months (around 3/7/2019), or if symptoms worsen or fail to improve.     * Surgery not found *      This document has been electronically signed by Quincy Yousif PA-C on November 16, 2018 12:48 PM      Results for orders placed or performed during the hospital encounter of 06/04/18   Gold Top - SST   Result Value Ref Range    Extra Tube Hold for add-ons.    Green Top (Gel)   Result Value Ref Range    Extra Tube Hold for add-ons.    CBC Auto Differential   Result Value Ref Range    WBC 7.03 3.20 - 9.80  10*3/mm3    RBC 4.96 4.37 - 5.74 10*6/mm3    Hemoglobin 14.9 13.7 - 17.3 g/dL    Hematocrit 42.8 39.0 - 49.0 %    MCV 86.3 80.0 - 98.0 fL    MCH 30.0 26.5 - 34.0 pg    MCHC 34.8 31.5 - 36.3 g/dL    RDW 12.5 11.5 - 14.5 %    RDW-SD 40.0 35.1 - 43.9 fl    MPV 9.6 8.0 - 12.0 fL    Platelets 227 150 - 450 10*3/mm3    Neutrophil % 60.6 37.0 - 80.0 %    Lymphocyte % 29.0 10.0 - 50.0 %    Monocyte % 8.5 0.0 - 12.0 %    Eosinophil % 1.7 0.0 - 7.0 %    Basophil % 0.1 0.0 - 2.0 %    Immature Grans % 0.1 0.0 - 0.5 %    Neutrophils, Absolute 4.25 2.00 - 8.60 10*3/mm3    Lymphocytes, Absolute 2.04 0.60 - 4.20 10*3/mm3    Monocytes, Absolute 0.60 0.00 - 0.90 10*3/mm3    Eosinophils, Absolute 0.12 0.00 - 0.70 10*3/mm3    Basophils, Absolute 0.01 0.00 - 0.20 10*3/mm3    Immature Grans, Absolute 0.01 0.00 - 0.02 10*3/mm3   Lavender Top   Result Value Ref Range    Extra Tube hold for add-on    Light Blue Top   Result Value Ref Range    Extra Tube hold for add-on    Troponin   Result Value Ref Range    Troponin I <0.012 <=0.034 ng/mL   Protime-INR   Result Value Ref Range    Protime 13.1 11.1 - 15.3 Seconds    INR 1.01 0.80 - 1.20   BNP   Result Value Ref Range    proBNP 33.2 0.0 - 450.0 pg/mL   Comprehensive Metabolic Panel   Result Value Ref Range    Glucose 93 60 - 100 mg/dL    BUN 11 7 - 21 mg/dL    Creatinine 0.95 0.70 - 1.30 mg/dL    Sodium 141 137 - 145 mmol/L    Potassium 4.1 3.5 - 5.1 mmol/L    Chloride 105 95 - 110 mmol/L    CO2 25.0 22.0 - 31.0 mmol/L    Calcium 9.0 8.4 - 10.2 mg/dL    Total Protein 7.4 6.3 - 8.6 g/dL    Albumin 4.40 3.40 - 4.80 g/dL    ALT (SGPT) 50 21 - 72 U/L    AST (SGOT) 35 17 - 59 U/L    Alkaline Phosphatase 45 38 - 126 U/L    Total Bilirubin 0.4 0.2 - 1.3 mg/dL    eGFR Non  Amer 86 63 - 147 mL/min/1.73    Globulin 3.0 2.3 - 3.5 gm/dL    A/G Ratio 1.5 1.1 - 1.8 g/dL    BUN/Creatinine Ratio 11.6 7.0 - 25.0    Anion Gap 11.0 5.0 - 15.0 mmol/L   Results for orders placed or performed in visit on  05/07/18   CBC Auto Differential   Result Value Ref Range    WBC 6.60 3.20 - 9.80 10*3/mm3    RBC 4.79 4.37 - 5.74 10*6/mm3    Hemoglobin 14.3 13.7 - 17.3 g/dL    Hematocrit 42.7 39.0 - 49.0 %    MCV 89.1 80.0 - 98.0 fL    MCH 29.9 26.5 - 34.0 pg    MCHC 33.5 31.5 - 36.3 g/dL    RDW 12.7 11.5 - 14.5 %    RDW-SD 40.9 35.1 - 43.9 fl    MPV 9.4 8.0 - 12.0 fL    Platelets 233 150 - 450 10*3/mm3    Neutrophil % 50.5 37.0 - 80.0 %    Lymphocyte % 37.1 10.0 - 50.0 %    Monocyte % 9.8 0.0 - 12.0 %    Eosinophil % 2.3 0.0 - 7.0 %    Basophil % 0.3 0.0 - 2.0 %    Neutrophils, Absolute 3.33 2.00 - 8.60 10*3/mm3    Lymphocytes, Absolute 2.45 0.60 - 4.20 10*3/mm3    Monocytes, Absolute 0.65 0.00 - 0.90 10*3/mm3    Eosinophils, Absolute 0.15 0.00 - 0.70 10*3/mm3    Basophils, Absolute 0.02 0.00 - 0.20 10*3/mm3   Comprehensive Metabolic Panel   Result Value Ref Range    Glucose 89 70 - 100 mg/dL    BUN 11 8 - 25 mg/dL    Creatinine 1.10 0.40 - 1.30 mg/dL    Sodium 138 134 - 146 mmol/L    Potassium 4.3 3.4 - 5.4 mmol/L    Chloride 103 100 - 112 mmol/L    CO2 28.0 20.0 - 32.0 mmol/L    Calcium 9.1 8.4 - 10.8 mg/dL    Total Protein 7.4 6.7 - 8.2 g/dL    Albumin 4.40 3.20 - 5.50 g/dL    ALT (SGPT) 30 10 - 60 U/L    AST (SGOT) 23 10 - 60 U/L    Alkaline Phosphatase 43 15 - 121 U/L    Total Bilirubin 0.5 0.2 - 1.0 mg/dL    eGFR Non  Amer 73 63 - 147 mL/min/1.73    Globulin 3.0 2.5 - 4.6 gm/dL    A/G Ratio 1.5 1.0 - 3.0 g/dL    BUN/Creatinine Ratio 10.0 7.0 - 25.0    Anion Gap 7.0 5.0 - 15.0 mmol/L   Results for orders placed or performed in visit on 12/14/17   CBC Auto Differential   Result Value Ref Range    WBC 10.58 (H) 3.20 - 9.80 10*3/mm3    RBC 5.09 4.37 - 5.74 10*6/mm3    Hemoglobin 14.9 13.7 - 17.3 g/dL    Hematocrit 43.8 39.0 - 49.0 %    MCV 86.1 80.0 - 98.0 fL    MCH 29.3 26.5 - 34.0 pg    MCHC 34.0 31.5 - 36.3 g/dL    RDW 12.1 11.5 - 14.5 %    RDW-SD 38.2 35.1 - 43.9 fl    MPV 10.5 8.0 - 12.0 fL    Platelets 247 150 -  450 10*3/mm3    Neutrophil % 56.6 37.0 - 80.0 %    Lymphocyte % 28.4 10.0 - 50.0 %    Monocyte % 12.9 (H) 0.0 - 12.0 %    Eosinophil % 1.7 0.0 - 7.0 %    Basophil % 0.2 0.0 - 2.0 %    Immature Grans % 0.2 0.0 - 0.5 %    Neutrophils, Absolute 5.98 2.00 - 8.60 10*3/mm3    Lymphocytes, Absolute 3.01 0.60 - 4.20 10*3/mm3    Monocytes, Absolute 1.37 (H) 0.00 - 0.90 10*3/mm3    Eosinophils, Absolute 0.18 0.00 - 0.70 10*3/mm3    Basophils, Absolute 0.02 0.00 - 0.20 10*3/mm3    Immature Grans, Absolute 0.02 0.00 - 0.02 10*3/mm3     *Note: Due to a large number of results and/or encounters for the requested time period, some results have not been displayed. A complete set of results can be found in Results Review.       Some portions of this note have been dictated using voice recognition software and may contain errors and/or omissions.

## 2018-11-07 NOTE — PATIENT INSTRUCTIONS

## 2018-11-28 ENCOUNTER — OFFICE VISIT (OUTPATIENT)
Dept: SURGERY | Facility: CLINIC | Age: 43
End: 2018-11-28

## 2018-11-28 VITALS
TEMPERATURE: 98 F | HEIGHT: 70 IN | BODY MASS INDEX: 31.87 KG/M2 | DIASTOLIC BLOOD PRESSURE: 80 MMHG | SYSTOLIC BLOOD PRESSURE: 110 MMHG | WEIGHT: 222.6 LBS | HEART RATE: 71 BPM

## 2018-11-28 DIAGNOSIS — K57.92 DIVERTICULITIS: Primary | ICD-10-CM

## 2018-11-28 PROCEDURE — 99203 OFFICE O/P NEW LOW 30 MIN: CPT | Performed by: SURGERY

## 2018-11-28 NOTE — PROGRESS NOTES
Chief Complaint   Patient presents with   • Abdominal Pain     diverticulitis        Abdominal Pain   This is a recurrent problem. The current episode started more than 1 month ago. The onset quality is gradual. The problem occurs every several days. The problem has been gradually worsening. The pain is located in the LLQ. The pain is moderate. The quality of the pain is colicky. The abdominal pain does not radiate. Pertinent negatives include no anorexia, arthralgias, belching, constipation, diarrhea, dysuria, fever, flatus, frequency, headaches, hematochezia, hematuria, melena, myalgias, nausea, vomiting or weight loss. Nothing aggravates the pain. Relieved by: Oral antibiotics. The treatment provided significant relief. Prior diagnostic workup includes CT scan. There is no history of abdominal surgery, colon cancer, Crohn's disease, gallstones, GERD, irritable bowel syndrome, pancreatitis, PUD or ulcerative colitis.   History of multiple episodes of diverticulitis over the last year.  He has not had any further episodes in the last 2 months.  He previous episodes been treated successfully with oral antibiotics.  Of late, he's had no fever, chills, nausea, vomiting, or other change in his bowel habits.    Last CT scan was 1 year ago and demonstrated the following:  Study Result        EXAMINATION:  Computed Tomography           REGION:    Abdomen / Pelvis                     INDICATION:   acute abd pain, poss diverticulitis, K21.0  Gastro-esophageal reflux disease with esophagitis K57.32  Diverticulitis of large intestine without perforation or abscess  without bleeding R10.33 Periumbilical pain    HISTORY:  [place]. IMAGING:    none            TECHNIQUE:      - reconstructions:    axial, coronal, sagittal         - contrast:      oral:  Yes ;   intravenous: Isovue 300, 100 mL  This exam was performed according to the departmental  dose-optimization program which includes automated exposure  control, adjustment of  the mA and/or kV according to patient size  and/or use of iterative reconstruction technique.           COMMENTS:            - - - CT ABDOMEN - - -          THORAX (INFERIOR):      - LUNG BASES:  clear      - PLEURA:    no fluid or mass      - HEART:    normal size, no pericardial fluid     - MISC:      n/a          ABDOMEN:     - LIVER:    normal size / contour, no ductal dilatation , no  focal lesion   - GB:      grossly negative    - CBD:      grossly negative   - SPLEEN:    normal size and contour   - PANCREAS:    normal in size, contour, no focal mass    - VISCERA:    normal caliber, no wall thickening     - MESENTERY:  no mesenteric mass   - CAVITY:    no free abdominal fluid, no free intraperitoneal  air   - BODY WALL:  wnl   - OSSEOUS:    grossly negative for age   - MISC:                                       RETROPERITONEUM:   - KIDNEYS:    normal size / contour, no collecting system  dilation        no evidence of an enhancing mass   - URETERS:    normal course, caliber   - ADRENALS:    normal size, contour   - MISC:      no sig retroperitoneal adenopathy or mass   - VASCULAR:    aorta / iliacs: wnl for age     - - - CT PELVIS - - -       - VISCERA:    There are inflammatory changes in the mid/distal  sigmoid colon presumably attributable to the presence of  diverticulitis. No evidence of focal fluid or free  intraperitoneal air. - MESENTERY:  no mass   - VASCULAR:    wnl for age   - CAVITY:    no free fluid / air   - BLADDER:    unremarkable   - OSSEOUS:    wnl    - MISC:  - PROSTATE:  grossly wnl      .     IMPRESSION:   CONCLUSION:  1. Uncomplicated sigmoid diverticulitis.          Electronically signed by:  DAISY Love MD  12/14/2017 7:05  PM CST Workstation: [id number]   Colonoscopy in April 2017 demonstrated:  - The perianal and digital rectal examinations were normal.  Findings:  - Several biopsies were obtained in the entire colon and in the terminal ileum with cold forceps for histology.  Verification  of patient identification for the specimen was done by the nurse using the patient's name, birth date and medical record  number. Estimated blood loss was minimal.  Past Medical History:   Diagnosis Date   • Diverticulitis    • GERD (gastroesophageal reflux disease)    • Pleurisy        Past Surgical History:   Procedure Laterality Date   • HERNIA REPAIR     • KNEE ARTHROSCOPY     • UPPER GASTROINTESTINAL ENDOSCOPY  04/07/2017   • VASECTOMY           Current Outpatient Medications:   •  dexlansoprazole (DEXILANT) 60 MG capsule, Take 1 capsule by mouth Daily., Disp: 90 capsule, Rfl: 3  •  ibuprofen (ADVIL,MOTRIN) 800 MG tablet, Take 800 mg by mouth As Needed., Disp: , Rfl: 0  •  LORazepam (ATIVAN) 1 MG tablet, TAKE 1 TABLET BY MOUTH THREE TIMES A DAY OR LESS FOR NERVOUSNESS, Disp: , Rfl: 1  •  mesalamine (LIALDA) 1.2 g EC tablet, Take 4 tablets by mouth Daily With Breakfast., Disp: 120 tablet, Rfl: 2  •  metroNIDAZOLE (FLAGYL) 500 MG tablet, Take 500 mg by mouth 2 (Two) Times a Day As Needed., Disp: , Rfl:   •  ondansetron ODT (ZOFRAN-ODT) 4 MG disintegrating tablet, Take 1 tablet by mouth Every 8 (Eight) Hours As Needed for Nausea or Vomiting., Disp: 30 tablet, Rfl: 1    No Known Allergies    Family History   Problem Relation Age of Onset   • Diverticulitis Mother    • No Known Problems Father        Social History     Socioeconomic History   • Marital status:      Spouse name: Not on file   • Number of children: Not on file   • Years of education: Not on file   • Highest education level: Not on file   Social Needs   • Financial resource strain: Not on file   • Food insecurity - worry: Not on file   • Food insecurity - inability: Not on file   • Transportation needs - medical: Not on file   • Transportation needs - non-medical: Not on file   Occupational History   • Not on file   Tobacco Use   • Smoking status: Former Smoker     Types: Cigarettes   • Smokeless tobacco: Current User     Types:  Chew   • Tobacco comment: 05/07/2018 - Patient reports when he utilized Cigarettes, he would utiize 1 pack of Cigarettes per week.  Patient reports he partakes of a Cigar occasionally, and currently utilizes chewing tobacco.   Substance and Sexual Activity   • Alcohol use: No   • Drug use: No   • Sexual activity: Defer   Other Topics Concern   • Not on file   Social History Narrative   • Not on file       Review of Systems   Constitutional: Negative for activity change, appetite change, chills, fever and weight loss.   HENT: Negative for hearing loss, nosebleeds and trouble swallowing.    Cardiovascular: Negative for chest pain, palpitations and leg swelling.   Gastrointestinal: Positive for abdominal pain. Negative for abdominal distention, anal bleeding, anorexia, blood in stool, constipation, diarrhea, flatus, hematochezia, melena, nausea, rectal pain and vomiting.   Endocrine: Negative for cold intolerance, heat intolerance, polydipsia and polyuria.   Genitourinary: Negative for decreased urine volume, difficulty urinating, dysuria, enuresis, frequency, hematuria and urgency.        He states that while he has no specific pain with urination, there is some pressure on his bladder especially during the episodes of diverticulitis.   Musculoskeletal: Negative for arthralgias, back pain, gait problem, myalgias and neck pain.   Skin: Negative for pallor, rash and wound.   Allergic/Immunologic: Negative for immunocompromised state.   Neurological: Negative for dizziness, seizures, weakness, light-headedness, numbness and headaches.   Psychiatric/Behavioral: Negative for agitation and behavioral problems. The patient is not nervous/anxious.        Physical Exam   Constitutional: He is oriented to person, place, and time. He appears well-developed and well-nourished.   HENT:   Head: Normocephalic and atraumatic.   Nose: Nose normal.   Eyes: Conjunctivae and EOM are normal. Right eye exhibits no discharge. Left eye  exhibits no discharge.   Neck: Trachea normal, normal range of motion and phonation normal. Neck supple. No JVD present. No tracheal deviation and no edema present. No thyromegaly present.   Cardiovascular: Normal rate and regular rhythm. Exam reveals no gallop and no friction rub.   No murmur heard.  Pulmonary/Chest: Effort normal. No accessory muscle usage. No respiratory distress. He has no decreased breath sounds. He has no wheezes. He has no rales. He exhibits no tenderness.   Abdominal: Soft. He exhibits no distension, no fluid wave, no ascites, no pulsatile midline mass and no mass. There is no tenderness. There is no rebound and no guarding. No hernia.   Musculoskeletal: Normal range of motion. He exhibits no edema, tenderness or deformity.   Lymphadenopathy:     He has no cervical adenopathy.        Left: No supraclavicular adenopathy present.   Neurological: He is alert and oriented to person, place, and time. He has normal strength. No cranial nerve deficit.   Skin: Skin is warm and dry. No rash noted. He is not diaphoretic. No erythema. No pallor.   Psychiatric: He has a normal mood and affect. His speech is normal and behavior is normal. Judgment and thought content normal. Cognition and memory are normal.   Vitals reviewed.        ASSESSMENT    Grover was seen today for abdominal pain.    Diagnoses and all orders for this visit:    Diverticulitis        PLAN    1.  Long discussion with the patient.  I indicated that acute perforation is unlikely in the setting of multiple episodes of diverticulitis, looking clearly happen.  I mentioned that acute perforation is usually first event.  2.  As he has not had any episodes in the last couple months, I will watch him another month and see what happens.  If he has further episodes I would recommend sigmoid colectomy.  If we are able to get by without that, I would simply follow him              This document has been electronically signed by Tylor Mason MD on  November 30, 2018 7:38 PM

## 2018-11-28 NOTE — PATIENT INSTRUCTIONS

## 2018-11-30 PROBLEM — K57.92 DIVERTICULITIS: Status: ACTIVE | Noted: 2018-11-30

## 2019-03-07 ENCOUNTER — OFFICE VISIT (OUTPATIENT)
Dept: GASTROENTEROLOGY | Facility: CLINIC | Age: 44
End: 2019-03-07

## 2019-03-07 VITALS
DIASTOLIC BLOOD PRESSURE: 80 MMHG | WEIGHT: 222 LBS | SYSTOLIC BLOOD PRESSURE: 108 MMHG | HEART RATE: 74 BPM | HEIGHT: 70 IN | BODY MASS INDEX: 31.78 KG/M2

## 2019-03-07 DIAGNOSIS — K21.00 GASTRO-ESOPHAGEAL REFLUX DISEASE WITH ESOPHAGITIS: ICD-10-CM

## 2019-03-07 DIAGNOSIS — R10.13 EPIGASTRIC PAIN: ICD-10-CM

## 2019-03-07 DIAGNOSIS — Z87.19 HISTORY OF DIVERTICULITIS: ICD-10-CM

## 2019-03-07 DIAGNOSIS — K57.30 DIVERTICULOSIS OF LARGE INTESTINE WITHOUT HEMORRHAGE: Primary | ICD-10-CM

## 2019-03-07 PROCEDURE — 99213 OFFICE O/P EST LOW 20 MIN: CPT | Performed by: PHYSICIAN ASSISTANT

## 2019-03-07 RX ORDER — DEXLANSOPRAZOLE 60 MG/1
60 CAPSULE, DELAYED RELEASE ORAL DAILY
Qty: 90 CAPSULE | Refills: 3 | Status: SHIPPED | OUTPATIENT
Start: 2019-03-07 | End: 2020-05-26 | Stop reason: SDUPTHER

## 2019-03-07 NOTE — PROGRESS NOTES
Chief Complaint   Patient presents with   • Heartburn   • Diverticulitis       ENDO PROCEDURE ORDERED:    Subjective    Grover Lee is a 43 y.o. male. he is here today for follow-up.    History of Present Illness    SUBJECTIVE:  Patient was seen on a recheck of his GERD, recent diverticulitis.  Last seen 11/07/2018.  At that time, I recommended he increase the Lialda to four daily.  He saw Dr. Mason on 11/28/2018 for the recurrent diverticulitis, but recommended following.  Patient did not repeat laboratories before this visit.    Patient currently denies abdominal pain.  He is on Dexilant for chronic GERD.  He denied nausea, vomiting, dysphagia.  He initially claimed to be taking Lialda.  He subsequently after some discussion confided to me that he has not been taking the Lialda since last visit.  He has not had any significant episodes of diverticulitis.  He could not tell me exactly why he has not been taking the Lialda.  His weight is up 1.2 pounds since last visit.  Last colonoscopy 04/07/2017.    ASSESSMENT/PLAN:  Patient with multiple previous episodes of diverticulitis.  He was reluctant to take the Lialda.  We will not refill that.  He does have some Flagyl and I recommended if he notices he starting to have another flare of his diverticular disease, he does have some Flagyl to start and is asked to contact the office immediately.  He understands the long term implications of this and the potential for surgery with potential poor outcomes.  Nevertheless, I refilled his Dexilant.  He is encouraged to continue to avoid gastric irritants.  Recommend follow up in 6 months, high fiber diet, further pending clinical course and the results of the above.       The following portions of the patient's history were reviewed and updated as appropriate:   Past Medical History:   Diagnosis Date   • Diverticulitis    • GERD (gastroesophageal reflux disease)    • Pleurisy      Past Surgical History:   Procedure  "Laterality Date   • COLONOSCOPY N/A 4/7/2017    Procedure: COLONOSCOPY;  Surgeon: Grover Pearson MD;  Location: [facility] ENDOSCOPY;  Service:    • ENDOSCOPY N/A 4/7/2017    Procedure: ESOPHAGOGASTRODUODENOSCOPY;  Surgeon: Grover Pearson MD;  Location: [facility] ENDOSCOPY;  Service:    • HERNIA REPAIR     • KNEE ARTHROSCOPY     • UPPER GASTROINTESTINAL ENDOSCOPY  04/07/2017   • VASECTOMY       Family History   Problem Relation Age of Onset   • Diverticulitis Mother    • No Known Problems Father        No Known Allergies  Social History     Socioeconomic History   • Marital status:      Spouse name: Not on file   • Number of children: Not on file   • Years of education: Not on file   • Highest education level: Not on file   Tobacco Use   • Smoking status: Former Smoker     Types: Cigarettes   • Smokeless tobacco: Current User     Types: Chew   • Tobacco comment: 05/07/2018 - Patient reports when he utilized Cigarettes, he would utiize 1 pack of Cigarettes per week.  Patient reports he partakes of a Cigar occasionally, and currently utilizes chewing tobacco.   Substance and Sexual Activity   • Alcohol use: No   • Drug use: No   • Sexual activity: Defer       Current Outpatient Medications:   •  dexlansoprazole (DEXILANT) 60 MG capsule, Take 1 capsule by mouth Daily., Disp: 90 capsule, Rfl: 3  •  ibuprofen (ADVIL,MOTRIN) 800 MG tablet, Take 800 mg by mouth As Needed., Disp: , Rfl: 0  •  LORazepam (ATIVAN) 1 MG tablet, TAKE 1 TABLET BY MOUTH THREE TIMES A DAY OR LESS FOR NERVOUSNESS, Disp: , Rfl: 1  •  metroNIDAZOLE (FLAGYL) 500 MG tablet, Take 500 mg by mouth 2 (Two) Times a Day As Needed., Disp: , Rfl:   •  ondansetron ODT (ZOFRAN-ODT) 4 MG disintegrating tablet, Take 1 tablet by mouth Every 8 (Eight) Hours As Needed for Nausea or Vomiting., Disp: 30 tablet, Rfl: 1  Review of Systems  Review of Systems       Objective    /80 (BP Location: Left arm)   Pulse 74   Ht 177.8 cm (70\")   Wt 101 kg (222 lb)   BMI " 31.85 kg/m²   Physical Exam   Constitutional: He is oriented to person, place, and time. He appears well-developed and well-nourished. No distress.   HENT:   Head: Normocephalic and atraumatic.   Eyes: EOM are normal. Pupils are equal, round, and reactive to light.   Neck: Normal range of motion.   Cardiovascular: Normal rate, regular rhythm and normal heart sounds.   Pulmonary/Chest: Effort normal and breath sounds normal.   Abdominal: Soft. Bowel sounds are normal. He exhibits no shifting dullness, no distension, no abdominal bruit, no ascites and no mass. There is no hepatosplenomegaly. There is tenderness. There is no rigidity, no rebound, no guarding and no CVA tenderness. No hernia. Hernia confirmed negative in the ventral area.   mild   Musculoskeletal: Normal range of motion.   Neurological: He is alert and oriented to person, place, and time.   Skin: Skin is warm and dry.   Psychiatric: He has a normal mood and affect. His behavior is normal. Judgment and thought content normal.   Nursing note and vitals reviewed.    Assessment/Plan      1. Diverticulosis of large intestine without hemorrhage    2. History of diverticulitis    3. Gastro-esophageal reflux disease with esophagitis    4. Epigastric pain    .   Grover was seen today for heartburn and diverticulitis.    Diagnoses and all orders for this visit:    Diverticulosis of large intestine without hemorrhage  -     Comprehensive Metabolic Panel; Future  -     CBC Auto Differential; Future    History of diverticulitis  -     Comprehensive Metabolic Panel; Future  -     CBC Auto Differential; Future    Gastro-esophageal reflux disease with esophagitis  -     Comprehensive Metabolic Panel; Future  -     CBC Auto Differential; Future    Epigastric pain  -     Comprehensive Metabolic Panel; Future  -     CBC Auto Differential; Future    Other orders  -     dexlansoprazole (DEXILANT) 60 MG capsule; Take 1 capsule by mouth Daily.        Orders placed during this  encounter include:  Orders Placed This Encounter   Procedures   • Comprehensive Metabolic Panel     Standing Status:   Future     Standing Expiration Date:   9/18/2019   • CBC Auto Differential     Standing Status:   Future     Standing Expiration Date:   9/18/2019       Medications prescribed:  New Medications Ordered This Visit   Medications   • dexlansoprazole (DEXILANT) 60 MG capsule     Sig: Take 1 capsule by mouth Daily.     Dispense:  90 capsule     Refill:  3     Discontinued Medications       Reason for Discontinue    mesalamine (LIALDA) 1.2 g EC tablet Non-compliance        Requested Prescriptions     Signed Prescriptions Disp Refills   • dexlansoprazole (DEXILANT) 60 MG capsule 90 capsule 3     Sig: Take 1 capsule by mouth Daily.       Review and/or summary of lab tests, radiology, procedures, medications. Review and summary of old records and obtaining of history. The risks and benefits of my recommendations, as well as other treatment options were discussed with the patient today. Questions were answered.    Follow-up: Return in about 6 months (around 9/7/2019), or if symptoms worsen or fail to improve.     * Surgery not found *      This document has been electronically signed by Quincy Yousif PA-C on March 17, 2019 7:28 PM      Results for orders placed or performed during the hospital encounter of 06/04/18   Gold Top - SST   Result Value Ref Range    Extra Tube Hold for add-ons.    Green Top (Gel)   Result Value Ref Range    Extra Tube Hold for add-ons.    CBC Auto Differential   Result Value Ref Range    WBC 7.03 3.20 - 9.80 10*3/mm3    RBC 4.96 4.37 - 5.74 10*6/mm3    Hemoglobin 14.9 13.7 - 17.3 g/dL    Hematocrit 42.8 39.0 - 49.0 %    MCV 86.3 80.0 - 98.0 fL    MCH 30.0 26.5 - 34.0 pg    MCHC 34.8 31.5 - 36.3 g/dL    RDW 12.5 11.5 - 14.5 %    RDW-SD 40.0 35.1 - 43.9 fl    MPV 9.6 8.0 - 12.0 fL    Platelets 227 150 - 450 10*3/mm3    Neutrophil % 60.6 37.0 - 80.0 %    Lymphocyte % 29.0 10.0 -  50.0 %    Monocyte % 8.5 0.0 - 12.0 %    Eosinophil % 1.7 0.0 - 7.0 %    Basophil % 0.1 0.0 - 2.0 %    Immature Grans % 0.1 0.0 - 0.5 %    Neutrophils, Absolute 4.25 2.00 - 8.60 10*3/mm3    Lymphocytes, Absolute 2.04 0.60 - 4.20 10*3/mm3    Monocytes, Absolute 0.60 0.00 - 0.90 10*3/mm3    Eosinophils, Absolute 0.12 0.00 - 0.70 10*3/mm3    Basophils, Absolute 0.01 0.00 - 0.20 10*3/mm3    Immature Grans, Absolute 0.01 0.00 - 0.02 10*3/mm3   Lavender Top   Result Value Ref Range    Extra Tube hold for add-on    Light Blue Top   Result Value Ref Range    Extra Tube hold for add-on    Troponin   Result Value Ref Range    Troponin I <0.012 <=0.034 ng/mL   Protime-INR   Result Value Ref Range    Protime 13.1 11.1 - 15.3 Seconds    INR 1.01 0.80 - 1.20   BNP   Result Value Ref Range    proBNP 33.2 0.0 - 450.0 pg/mL   Comprehensive Metabolic Panel   Result Value Ref Range    Glucose 93 60 - 100 mg/dL    BUN 11 7 - 21 mg/dL    Creatinine 0.95 0.70 - 1.30 mg/dL    Sodium 141 137 - 145 mmol/L    Potassium 4.1 3.5 - 5.1 mmol/L    Chloride 105 95 - 110 mmol/L    CO2 25.0 22.0 - 31.0 mmol/L    Calcium 9.0 8.4 - 10.2 mg/dL    Total Protein 7.4 6.3 - 8.6 g/dL    Albumin 4.40 3.40 - 4.80 g/dL    ALT (SGPT) 50 21 - 72 U/L    AST (SGOT) 35 17 - 59 U/L    Alkaline Phosphatase 45 38 - 126 U/L    Total Bilirubin 0.4 0.2 - 1.3 mg/dL    eGFR Non  Amer 86 63 - 147 mL/min/1.73    Globulin 3.0 2.3 - 3.5 gm/dL    A/G Ratio 1.5 1.1 - 1.8 g/dL    BUN/Creatinine Ratio 11.6 7.0 - 25.0    Anion Gap 11.0 5.0 - 15.0 mmol/L   Results for orders placed or performed in visit on 05/07/18   CBC Auto Differential   Result Value Ref Range    WBC 6.60 3.20 - 9.80 10*3/mm3    RBC 4.79 4.37 - 5.74 10*6/mm3    Hemoglobin 14.3 13.7 - 17.3 g/dL    Hematocrit 42.7 39.0 - 49.0 %    MCV 89.1 80.0 - 98.0 fL    MCH 29.9 26.5 - 34.0 pg    MCHC 33.5 31.5 - 36.3 g/dL    RDW 12.7 11.5 - 14.5 %    RDW-SD 40.9 35.1 - 43.9 fl    MPV 9.4 8.0 - 12.0 fL    Platelets 233  150 - 450 10*3/mm3    Neutrophil % 50.5 37.0 - 80.0 %    Lymphocyte % 37.1 10.0 - 50.0 %    Monocyte % 9.8 0.0 - 12.0 %    Eosinophil % 2.3 0.0 - 7.0 %    Basophil % 0.3 0.0 - 2.0 %    Neutrophils, Absolute 3.33 2.00 - 8.60 10*3/mm3    Lymphocytes, Absolute 2.45 0.60 - 4.20 10*3/mm3    Monocytes, Absolute 0.65 0.00 - 0.90 10*3/mm3    Eosinophils, Absolute 0.15 0.00 - 0.70 10*3/mm3    Basophils, Absolute 0.02 0.00 - 0.20 10*3/mm3   Comprehensive Metabolic Panel   Result Value Ref Range    Glucose 89 70 - 100 mg/dL    BUN 11 8 - 25 mg/dL    Creatinine 1.10 0.40 - 1.30 mg/dL    Sodium 138 134 - 146 mmol/L    Potassium 4.3 3.4 - 5.4 mmol/L    Chloride 103 100 - 112 mmol/L    CO2 28.0 20.0 - 32.0 mmol/L    Calcium 9.1 8.4 - 10.8 mg/dL    Total Protein 7.4 6.7 - 8.2 g/dL    Albumin 4.40 3.20 - 5.50 g/dL    ALT (SGPT) 30 10 - 60 U/L    AST (SGOT) 23 10 - 60 U/L    Alkaline Phosphatase 43 15 - 121 U/L    Total Bilirubin 0.5 0.2 - 1.0 mg/dL    eGFR Non  Amer 73 63 - 147 mL/min/1.73    Globulin 3.0 2.5 - 4.6 gm/dL    A/G Ratio 1.5 1.0 - 3.0 g/dL    BUN/Creatinine Ratio 10.0 7.0 - 25.0    Anion Gap 7.0 5.0 - 15.0 mmol/L   Results for orders placed or performed in visit on 12/14/17   CBC Auto Differential   Result Value Ref Range    WBC 10.58 (H) 3.20 - 9.80 10*3/mm3    RBC 5.09 4.37 - 5.74 10*6/mm3    Hemoglobin 14.9 13.7 - 17.3 g/dL    Hematocrit 43.8 39.0 - 49.0 %    MCV 86.1 80.0 - 98.0 fL    MCH 29.3 26.5 - 34.0 pg    MCHC 34.0 31.5 - 36.3 g/dL    RDW 12.1 11.5 - 14.5 %    RDW-SD 38.2 35.1 - 43.9 fl    MPV 10.5 8.0 - 12.0 fL    Platelets 247 150 - 450 10*3/mm3    Neutrophil % 56.6 37.0 - 80.0 %    Lymphocyte % 28.4 10.0 - 50.0 %    Monocyte % 12.9 (H) 0.0 - 12.0 %    Eosinophil % 1.7 0.0 - 7.0 %    Basophil % 0.2 0.0 - 2.0 %    Immature Grans % 0.2 0.0 - 0.5 %    Neutrophils, Absolute 5.98 2.00 - 8.60 10*3/mm3    Lymphocytes, Absolute 3.01 0.60 - 4.20 10*3/mm3    Monocytes, Absolute 1.37 (H) 0.00 - 0.90 10*3/mm3     Eosinophils, Absolute 0.18 0.00 - 0.70 10*3/mm3    Basophils, Absolute 0.02 0.00 - 0.20 10*3/mm3    Immature Grans, Absolute 0.02 0.00 - 0.02 10*3/mm3     *Note: Due to a large number of results and/or encounters for the requested time period, some results have not been displayed. A complete set of results can be found in Results Review.       Some portions of this note have been dictated using voice recognition software and may contain errors and/or omissions.

## 2019-03-07 NOTE — PATIENT INSTRUCTIONS

## 2019-07-09 ENCOUNTER — TELEPHONE (OUTPATIENT)
Dept: GASTROENTEROLOGY | Facility: CLINIC | Age: 44
End: 2019-07-09

## 2019-07-09 ENCOUNTER — LAB (OUTPATIENT)
Dept: LAB | Facility: OTHER | Age: 44
End: 2019-07-09

## 2019-07-09 DIAGNOSIS — K57.30 DIVERTICULOSIS OF LARGE INTESTINE WITHOUT DIVERTICULITIS: Primary | ICD-10-CM

## 2019-07-09 DIAGNOSIS — K57.32 DIVERTICULITIS OF LARGE INTESTINE WITHOUT PERFORATION OR ABSCESS WITHOUT BLEEDING: ICD-10-CM

## 2019-07-09 DIAGNOSIS — K57.30 DIVERTICULOSIS OF LARGE INTESTINE WITHOUT DIVERTICULITIS: ICD-10-CM

## 2019-07-09 LAB
BASOPHILS # BLD AUTO: 0.01 10*3/MM3 (ref 0–0.2)
BASOPHILS NFR BLD AUTO: 0.1 % (ref 0–1.5)
DEPRECATED RDW RBC AUTO: 39.4 FL (ref 37–54)
EOSINOPHIL # BLD AUTO: 0.11 10*3/MM3 (ref 0–0.4)
EOSINOPHIL NFR BLD AUTO: 0.9 % (ref 0.3–6.2)
ERYTHROCYTE [DISTWIDTH] IN BLOOD BY AUTOMATED COUNT: 12.5 % (ref 12.3–15.4)
HCT VFR BLD AUTO: 43.6 % (ref 37.5–51)
HGB BLD-MCNC: 15.3 G/DL (ref 13–17.7)
LYMPHOCYTES # BLD AUTO: 2.05 10*3/MM3 (ref 0.7–3.1)
LYMPHOCYTES NFR BLD AUTO: 17 % (ref 19.6–45.3)
MCH RBC QN AUTO: 30.8 PG (ref 26.6–33)
MCHC RBC AUTO-ENTMCNC: 35.1 G/DL (ref 31.5–35.7)
MCV RBC AUTO: 87.9 FL (ref 79–97)
MONOCYTES # BLD AUTO: 0.94 10*3/MM3 (ref 0.1–0.9)
MONOCYTES NFR BLD AUTO: 7.8 % (ref 5–12)
NEUTROPHILS # BLD AUTO: 8.93 10*3/MM3 (ref 1.7–7)
NEUTROPHILS NFR BLD AUTO: 74.2 % (ref 42.7–76)
PLATELET # BLD AUTO: 228 10*3/MM3 (ref 140–450)
PMV BLD AUTO: 9.1 FL (ref 6–12)
RBC # BLD AUTO: 4.96 10*6/MM3 (ref 4.14–5.8)
WBC NRBC COR # BLD: 12.04 10*3/MM3 (ref 3.4–10.8)

## 2019-07-09 PROCEDURE — 36415 COLL VENOUS BLD VENIPUNCTURE: CPT | Performed by: INTERNAL MEDICINE

## 2019-07-09 PROCEDURE — 85025 COMPLETE CBC W/AUTO DIFF WBC: CPT | Performed by: INTERNAL MEDICINE

## 2019-07-09 RX ORDER — ONDANSETRON 4 MG/1
4 TABLET, ORALLY DISINTEGRATING ORAL EVERY 8 HOURS PRN
Qty: 30 TABLET | Refills: 1 | Status: SHIPPED | OUTPATIENT
Start: 2019-07-09 | End: 2020-07-22 | Stop reason: SDUPTHER

## 2019-07-09 RX ORDER — METRONIDAZOLE 500 MG/1
500 TABLET ORAL 2 TIMES DAILY
Qty: 20 TABLET | Refills: 0 | Status: SHIPPED | OUTPATIENT
Start: 2019-07-09 | End: 2019-09-09

## 2019-07-09 NOTE — TELEPHONE ENCOUNTER
Patient has been contacted and made aware that he will need to have labs drawn today and that an rx for Flagyl 500 bid for 10 days has been sent to his pharmacy of choice. Patient voiced understanding.

## 2019-07-09 NOTE — TELEPHONE ENCOUNTER
----- Message from Quincy Yousif PA-C sent at 7/9/2019 12:19 PM CDT -----  Have him get a CBC. Flagyl 500bid x10 day  ----- Message -----  From: Maame Sinha MA  Sent: 7/9/2019  11:41 AM  To: Quincy Yousif PA-C    Pt called with a possible flare and wanted a refill on flagyl?

## 2019-09-09 ENCOUNTER — OFFICE VISIT (OUTPATIENT)
Dept: GASTROENTEROLOGY | Facility: CLINIC | Age: 44
End: 2019-09-09

## 2019-09-09 VITALS
BODY MASS INDEX: 31.78 KG/M2 | DIASTOLIC BLOOD PRESSURE: 82 MMHG | WEIGHT: 222 LBS | HEART RATE: 76 BPM | SYSTOLIC BLOOD PRESSURE: 112 MMHG | HEIGHT: 70 IN

## 2019-09-09 DIAGNOSIS — R10.13 EPIGASTRIC PAIN: ICD-10-CM

## 2019-09-09 DIAGNOSIS — K57.30 DIVERTICULOSIS OF LARGE INTESTINE WITHOUT HEMORRHAGE: ICD-10-CM

## 2019-09-09 DIAGNOSIS — K21.00 GASTROESOPHAGEAL REFLUX DISEASE WITH ESOPHAGITIS: Primary | ICD-10-CM

## 2019-09-09 PROCEDURE — 99213 OFFICE O/P EST LOW 20 MIN: CPT | Performed by: PHYSICIAN ASSISTANT

## 2019-09-09 NOTE — PATIENT INSTRUCTIONS

## 2019-09-09 NOTE — PROGRESS NOTES
Chief Complaint   Patient presents with   • Heartburn   • Diverticulitis       ENDO PROCEDURE ORDERED:    Subjective    Grover Lee is a 44 y.o. male. he is here today for follow-up.    History of Present Illness    Patient is seen on a recheck of his GERD, recurrent diverticulitis with known diverticular disease.  Last seen 03/07/2019.  He was given a round of Flagyl for another flare of his diverticular disease on 07/09/2019.  He had called with early episode and CBC at that time showed white count increased at 12.04.  He did complete the Flagyl and has done well.  That was his first flare since his last visit.  He had declined to stay on a 5-ASA.  It is not clear if it gave him much improvement.  He has really tried to be more careful with his diet.  He thinks he had more spicy foods and jalapenos, etc., and salads that may have triggered his last bout.  He is trying to be careful with his diet.  His weight has been stable.  No nausea, vomiting, dysphagia.  Bowels are fairly regular without blood or mucus.  GERD is doing well on Dexilant 60 mg daily.  Last colonoscopy 04/07/2017.    ASSESSMENT/PLAN:  Patient with recurrent diverticulitis with known diverticular disease.  Again, I discussed with him the long-term risk to the diverticular disease, potential for surgical intervention, the importance of following up early if he has another bout.  He is encouraged to continue dietary modification with high fiber diet irritants.  As long as he is doing well, we will continue on the Dexilant and followup in 6 months with CMP, CBC prior.  Further pending clinical course and the results of the above.       The following portions of the patient's history were reviewed and updated as appropriate:   Past Medical History:   Diagnosis Date   • Diverticulitis    • GERD (gastroesophageal reflux disease)    • Pleurisy      Past Surgical History:   Procedure Laterality Date   • COLONOSCOPY N/A 4/7/2017    Procedure:  COLONOSCOPY;  Surgeon: Grover Pearson MD;  Location: [facility] ENDOSCOPY;  Service:    • ENDOSCOPY N/A 4/7/2017    Procedure: ESOPHAGOGASTRODUODENOSCOPY;  Surgeon: Grover Pearson MD;  Location: [facility] ENDOSCOPY;  Service:    • HERNIA REPAIR     • KNEE ARTHROSCOPY     • UPPER GASTROINTESTINAL ENDOSCOPY  04/07/2017   • VASECTOMY       Family History   Problem Relation Age of Onset   • Diverticulitis Mother    • No Known Problems Father        No Known Allergies  Social History     Socioeconomic History   • Marital status:      Spouse name: Not on file   • Number of children: Not on file   • Years of education: Not on file   • Highest education level: Not on file   Tobacco Use   • Smoking status: Former Smoker     Types: Cigarettes   • Smokeless tobacco: Current User     Types: Chew   • Tobacco comment: 05/07/2018 - Patient reports when he utilized Cigarettes, he would utiize 1 pack of Cigarettes per week.  Patient reports he partakes of a Cigar occasionally, and currently utilizes chewing tobacco.   Substance and Sexual Activity   • Alcohol use: No   • Drug use: No   • Sexual activity: Defer     Current Medications:  Prior to Admission medications    Medication Sig Start Date End Date Taking? Authorizing Provider   dexlansoprazole (DEXILANT) 60 MG capsule Take 1 capsule by mouth Daily. 3/7/19  Yes Quincy Yousif PA-C   ibuprofen (ADVIL,MOTRIN) 800 MG tablet Take 800 mg by mouth As Needed. 1/3/18  Yes ProviderMaicol MD   LORazepam (ATIVAN) 1 MG tablet TAKE 1 TABLET BY MOUTH THREE TIMES A DAY OR LESS FOR NERVOUSNESS 10/5/18  Yes ProviderMaicol MD   ondansetron ODT (ZOFRAN-ODT) 4 MG disintegrating tablet Take 1 tablet by mouth Every 8 (Eight) Hours As Needed for Nausea or Vomiting. 7/9/19  Yes Quincy Yousif PA-C   metroNIDAZOLE (FLAGYL) 500 MG tablet Take 1 tablet by mouth 2 (Two) Times a Day. 7/9/19 9/9/19  Quincy Yousif PA-C     Review of Systems  Review of Systems       Objective    BP  "112/82 (BP Location: Left arm)   Pulse 76   Ht 177.8 cm (70\")   Wt 101 kg (222 lb)   BMI 31.85 kg/m²   Physical Exam   Constitutional: He is oriented to person, place, and time. He appears well-developed and well-nourished. No distress.   HENT:   Head: Normocephalic and atraumatic.   Eyes: EOM are normal. Pupils are equal, round, and reactive to light.   Neck: Normal range of motion.   Cardiovascular: Normal rate, regular rhythm and normal heart sounds.   Pulmonary/Chest: Effort normal and breath sounds normal.   Abdominal: Soft. Bowel sounds are normal. He exhibits no shifting dullness, no distension, no abdominal bruit, no ascites and no mass. There is no hepatosplenomegaly. There is tenderness. There is no rigidity, no rebound, no guarding and no CVA tenderness. No hernia. Hernia confirmed negative in the ventral area.   mild   Musculoskeletal: Normal range of motion.   Neurological: He is alert and oriented to person, place, and time.   Skin: Skin is warm and dry.   Psychiatric: He has a normal mood and affect. His behavior is normal. Judgment and thought content normal.   Nursing note and vitals reviewed.    Assessment/Plan      1. Gastroesophageal reflux disease with esophagitis    2. Epigastric pain    3. Diverticulosis of large intestine without hemorrhage    .   Grover was seen today for heartburn and diverticulitis.    Diagnoses and all orders for this visit:    Gastroesophageal reflux disease with esophagitis  -     CBC Auto Differential; Future  -     Comprehensive Metabolic Panel; Future    Epigastric pain  -     CBC Auto Differential; Future  -     Comprehensive Metabolic Panel; Future    Diverticulosis of large intestine without hemorrhage  -     CBC Auto Differential; Future  -     Comprehensive Metabolic Panel; Future        Orders placed during this encounter include:  Orders Placed This Encounter   Procedures   • CBC Auto Differential     Standing Status:   Future     Standing Expiration Date:   " 3/22/2020   • Comprehensive Metabolic Panel     Standing Status:   Future     Standing Expiration Date:   3/22/2020       Medications prescribed:  No orders of the defined types were placed in this encounter.    Discontinued Medications       Reason for Discontinue    metroNIDAZOLE (FLAGYL) 500 MG tablet *Therapy completed        Requested Prescriptions      No prescriptions requested or ordered in this encounter       Review and/or summary of lab tests, radiology, procedures, medications. Review and summary of old records and obtaining of history. The risks and benefits of my recommendations, as well as other treatment options were discussed with the patient today. Questions were answered.    Follow-up: Return in about 6 months (around 3/9/2020), or if symptoms worsen or fail to improve.     * Surgery not found *      This document has been electronically signed by Quinyc Yousif PA-C on September 10, 2019 7:55 PM      Results for orders placed or performed in visit on 07/09/19   CBC Auto Differential   Result Value Ref Range    WBC 12.04 (H) 3.40 - 10.80 10*3/mm3    RBC 4.96 4.14 - 5.80 10*6/mm3    Hemoglobin 15.3 13.0 - 17.7 g/dL    Hematocrit 43.6 37.5 - 51.0 %    MCV 87.9 79.0 - 97.0 fL    MCH 30.8 26.6 - 33.0 pg    MCHC 35.1 31.5 - 35.7 g/dL    RDW 12.5 12.3 - 15.4 %    RDW-SD 39.4 37.0 - 54.0 fl    MPV 9.1 6.0 - 12.0 fL    Platelets 228 140 - 450 10*3/mm3    Neutrophil % 74.2 42.7 - 76.0 %    Lymphocyte % 17.0 (L) 19.6 - 45.3 %    Monocyte % 7.8 5.0 - 12.0 %    Eosinophil % 0.9 0.3 - 6.2 %    Basophil % 0.1 0.0 - 1.5 %    Neutrophils, Absolute 8.93 (H) 1.70 - 7.00 10*3/mm3    Lymphocytes, Absolute 2.05 0.70 - 3.10 10*3/mm3    Monocytes, Absolute 0.94 (H) 0.10 - 0.90 10*3/mm3    Eosinophils, Absolute 0.11 0.00 - 0.40 10*3/mm3    Basophils, Absolute 0.01 0.00 - 0.20 10*3/mm3   Results for orders placed or performed during the hospital encounter of 06/04/18   Gold Top - [facility]   Result Value Ref Range    Extra  Tube Hold for add-ons.    Green Top (Gel)   Result Value Ref Range    Extra Tube Hold for add-ons.    CBC Auto Differential   Result Value Ref Range    WBC 7.03 3.20 - 9.80 10*3/mm3    RBC 4.96 4.37 - 5.74 10*6/mm3    Hemoglobin 14.9 13.7 - 17.3 g/dL    Hematocrit 42.8 39.0 - 49.0 %    MCV 86.3 80.0 - 98.0 fL    MCH 30.0 26.5 - 34.0 pg    MCHC 34.8 31.5 - 36.3 g/dL    RDW 12.5 11.5 - 14.5 %    RDW-SD 40.0 35.1 - 43.9 fl    MPV 9.6 8.0 - 12.0 fL    Platelets 227 150 - 450 10*3/mm3    Neutrophil % 60.6 37.0 - 80.0 %    Lymphocyte % 29.0 10.0 - 50.0 %    Monocyte % 8.5 0.0 - 12.0 %    Eosinophil % 1.7 0.0 - 7.0 %    Basophil % 0.1 0.0 - 2.0 %    Immature Grans % 0.1 0.0 - 0.5 %    Neutrophils, Absolute 4.25 2.00 - 8.60 10*3/mm3    Lymphocytes, Absolute 2.04 0.60 - 4.20 10*3/mm3    Monocytes, Absolute 0.60 0.00 - 0.90 10*3/mm3    Eosinophils, Absolute 0.12 0.00 - 0.70 10*3/mm3    Basophils, Absolute 0.01 0.00 - 0.20 10*3/mm3    Immature Grans, Absolute 0.01 0.00 - 0.02 10*3/mm3   Lavender Top   Result Value Ref Range    Extra Tube hold for add-on    Light Blue Top   Result Value Ref Range    Extra Tube hold for add-on    Troponin   Result Value Ref Range    Troponin I <0.012 <=0.034 ng/mL   Protime-INR   Result Value Ref Range    Protime 13.1 11.1 - 15.3 Seconds    INR 1.01 0.80 - 1.20   BNP   Result Value Ref Range    proBNP 33.2 0.0 - 450.0 pg/mL   Comprehensive Metabolic Panel   Result Value Ref Range    Glucose 93 60 - 100 mg/dL    BUN 11 7 - 21 mg/dL    Creatinine 0.95 0.70 - 1.30 mg/dL    Sodium 141 137 - 145 mmol/L    Potassium 4.1 3.5 - 5.1 mmol/L    Chloride 105 95 - 110 mmol/L    CO2 25.0 22.0 - 31.0 mmol/L    Calcium 9.0 8.4 - 10.2 mg/dL    Total Protein 7.4 6.3 - 8.6 g/dL    Albumin 4.40 3.40 - 4.80 g/dL    ALT (SGPT) 50 21 - 72 U/L    AST (SGOT) 35 17 - 59 U/L    Alkaline Phosphatase 45 38 - 126 U/L    Total Bilirubin 0.4 0.2 - 1.3 mg/dL    eGFR Non  Amer 86 63 - 147 mL/min/1.73    Globulin 3.0 2.3 -  3.5 gm/dL    A/G Ratio 1.5 1.1 - 1.8 g/dL    BUN/Creatinine Ratio 11.6 7.0 - 25.0    Anion Gap 11.0 5.0 - 15.0 mmol/L   Results for orders placed or performed in visit on 05/07/18   CBC Auto Differential   Result Value Ref Range    WBC 6.60 3.20 - 9.80 10*3/mm3    RBC 4.79 4.37 - 5.74 10*6/mm3    Hemoglobin 14.3 13.7 - 17.3 g/dL    Hematocrit 42.7 39.0 - 49.0 %    MCV 89.1 80.0 - 98.0 fL    MCH 29.9 26.5 - 34.0 pg    MCHC 33.5 31.5 - 36.3 g/dL    RDW 12.7 11.5 - 14.5 %    RDW-SD 40.9 35.1 - 43.9 fl    MPV 9.4 8.0 - 12.0 fL    Platelets 233 150 - 450 10*3/mm3    Neutrophil % 50.5 37.0 - 80.0 %    Lymphocyte % 37.1 10.0 - 50.0 %    Monocyte % 9.8 0.0 - 12.0 %    Eosinophil % 2.3 0.0 - 7.0 %    Basophil % 0.3 0.0 - 2.0 %    Neutrophils, Absolute 3.33 2.00 - 8.60 10*3/mm3    Lymphocytes, Absolute 2.45 0.60 - 4.20 10*3/mm3    Monocytes, Absolute 0.65 0.00 - 0.90 10*3/mm3    Eosinophils, Absolute 0.15 0.00 - 0.70 10*3/mm3    Basophils, Absolute 0.02 0.00 - 0.20 10*3/mm3   Comprehensive Metabolic Panel   Result Value Ref Range    Glucose 89 70 - 100 mg/dL    BUN 11 8 - 25 mg/dL    Creatinine 1.10 0.40 - 1.30 mg/dL    Sodium 138 134 - 146 mmol/L    Potassium 4.3 3.4 - 5.4 mmol/L    Chloride 103 100 - 112 mmol/L    CO2 28.0 20.0 - 32.0 mmol/L    Calcium 9.1 8.4 - 10.8 mg/dL    Total Protein 7.4 6.7 - 8.2 g/dL    Albumin 4.40 3.20 - 5.50 g/dL    ALT (SGPT) 30 10 - 60 U/L    AST (SGOT) 23 10 - 60 U/L    Alkaline Phosphatase 43 15 - 121 U/L    Total Bilirubin 0.5 0.2 - 1.0 mg/dL    eGFR Non  Amer 73 63 - 147 mL/min/1.73    Globulin 3.0 2.5 - 4.6 gm/dL    A/G Ratio 1.5 1.0 - 3.0 g/dL    BUN/Creatinine Ratio 10.0 7.0 - 25.0    Anion Gap 7.0 5.0 - 15.0 mmol/L     *Note: Due to a large number of results and/or encounters for the requested time period, some results have not been displayed. A complete set of results can be found in Results Review.       Some portions of this note have been dictated using voice recognition  software and may contain errors and/or omissions.

## 2020-03-09 ENCOUNTER — LAB (OUTPATIENT)
Dept: LAB | Facility: OTHER | Age: 45
End: 2020-03-09

## 2020-03-09 ENCOUNTER — OFFICE VISIT (OUTPATIENT)
Dept: GASTROENTEROLOGY | Facility: CLINIC | Age: 45
End: 2020-03-09

## 2020-03-09 VITALS
WEIGHT: 230 LBS | HEIGHT: 70 IN | SYSTOLIC BLOOD PRESSURE: 116 MMHG | HEART RATE: 74 BPM | DIASTOLIC BLOOD PRESSURE: 82 MMHG | BODY MASS INDEX: 32.93 KG/M2

## 2020-03-09 DIAGNOSIS — Z87.19 HISTORY OF DIVERTICULITIS: ICD-10-CM

## 2020-03-09 DIAGNOSIS — K57.30 DIVERTICULOSIS OF LARGE INTESTINE WITHOUT HEMORRHAGE: ICD-10-CM

## 2020-03-09 DIAGNOSIS — K21.00 GASTROESOPHAGEAL REFLUX DISEASE WITH ESOPHAGITIS: ICD-10-CM

## 2020-03-09 DIAGNOSIS — K21.00 GASTROESOPHAGEAL REFLUX DISEASE WITH ESOPHAGITIS: Primary | ICD-10-CM

## 2020-03-09 DIAGNOSIS — R10.13 EPIGASTRIC PAIN: ICD-10-CM

## 2020-03-09 LAB
ALBUMIN SERPL-MCNC: 4.3 G/DL (ref 3.5–5)
ALBUMIN/GLOB SERPL: 1.3 G/DL (ref 1.1–1.8)
ALP SERPL-CCNC: 48 U/L (ref 38–126)
ALT SERPL W P-5'-P-CCNC: 55 U/L
ANION GAP SERPL CALCULATED.3IONS-SCNC: 11 MMOL/L (ref 5–15)
AST SERPL-CCNC: 42 U/L (ref 17–59)
BASOPHILS # BLD AUTO: 0.03 10*3/MM3 (ref 0–0.2)
BASOPHILS NFR BLD AUTO: 0.4 % (ref 0–1.5)
BILIRUB SERPL-MCNC: 0.3 MG/DL (ref 0.2–1.3)
BUN BLD-MCNC: 13 MG/DL (ref 7–23)
BUN/CREAT SERPL: 10.7 (ref 7–25)
CALCIUM SPEC-SCNC: 9.3 MG/DL (ref 8.4–10.2)
CHLORIDE SERPL-SCNC: 104 MMOL/L (ref 101–112)
CO2 SERPL-SCNC: 25 MMOL/L (ref 22–30)
CREAT BLD-MCNC: 1.21 MG/DL (ref 0.7–1.3)
DEPRECATED RDW RBC AUTO: 39.5 FL (ref 37–54)
EOSINOPHIL # BLD AUTO: 0.29 10*3/MM3 (ref 0–0.4)
EOSINOPHIL NFR BLD AUTO: 3.8 % (ref 0.3–6.2)
ERYTHROCYTE [DISTWIDTH] IN BLOOD BY AUTOMATED COUNT: 12.6 % (ref 12.3–15.4)
GFR SERPL CREATININE-BSD FRML MDRD: 65 ML/MIN/1.73 (ref 63–147)
GLOBULIN UR ELPH-MCNC: 3.2 GM/DL (ref 2.3–3.5)
GLUCOSE BLD-MCNC: 114 MG/DL (ref 70–99)
HCT VFR BLD AUTO: 42.3 % (ref 37.5–51)
HGB BLD-MCNC: 14.4 G/DL (ref 13–17.7)
LYMPHOCYTES # BLD AUTO: 2.73 10*3/MM3 (ref 0.7–3.1)
LYMPHOCYTES NFR BLD AUTO: 35.5 % (ref 19.6–45.3)
MCH RBC QN AUTO: 29.6 PG (ref 26.6–33)
MCHC RBC AUTO-ENTMCNC: 34 G/DL (ref 31.5–35.7)
MCV RBC AUTO: 86.9 FL (ref 79–97)
MONOCYTES # BLD AUTO: 0.64 10*3/MM3 (ref 0.1–0.9)
MONOCYTES NFR BLD AUTO: 8.3 % (ref 5–12)
NEUTROPHILS # BLD AUTO: 4.01 10*3/MM3 (ref 1.7–7)
NEUTROPHILS NFR BLD AUTO: 52 % (ref 42.7–76)
PLATELET # BLD AUTO: 246 10*3/MM3 (ref 140–450)
PMV BLD AUTO: 9.5 FL (ref 6–12)
POTASSIUM BLD-SCNC: 4.3 MMOL/L (ref 3.4–5)
PROT SERPL-MCNC: 7.5 G/DL (ref 6.3–8.6)
RBC # BLD AUTO: 4.87 10*6/MM3 (ref 4.14–5.8)
SODIUM BLD-SCNC: 140 MMOL/L (ref 137–145)
WBC NRBC COR # BLD: 7.7 10*3/MM3 (ref 3.4–10.8)

## 2020-03-09 PROCEDURE — 99213 OFFICE O/P EST LOW 20 MIN: CPT | Performed by: PHYSICIAN ASSISTANT

## 2020-03-09 PROCEDURE — 36415 COLL VENOUS BLD VENIPUNCTURE: CPT | Performed by: PHYSICIAN ASSISTANT

## 2020-03-09 PROCEDURE — 85025 COMPLETE CBC W/AUTO DIFF WBC: CPT | Performed by: PHYSICIAN ASSISTANT

## 2020-03-09 PROCEDURE — 80053 COMPREHEN METABOLIC PANEL: CPT | Performed by: PHYSICIAN ASSISTANT

## 2020-03-09 RX ORDER — SODIUM, POTASSIUM,MAG SULFATES 17.5-3.13G
SOLUTION, RECONSTITUTED, ORAL ORAL
Qty: 2 BOTTLE | Refills: 0 | Status: SHIPPED | OUTPATIENT
Start: 2020-03-09 | End: 2020-07-22

## 2020-03-09 RX ORDER — METRONIDAZOLE 500 MG/1
500 TABLET ORAL AS NEEDED
COMMUNITY
End: 2020-07-22 | Stop reason: SDUPTHER

## 2020-03-09 RX ORDER — DEXTROSE AND SODIUM CHLORIDE 5; .45 G/100ML; G/100ML
30 INJECTION, SOLUTION INTRAVENOUS CONTINUOUS PRN
Status: CANCELLED | OUTPATIENT
Start: 2020-07-15

## 2020-03-09 NOTE — PROGRESS NOTES
Chief Complaint   Patient presents with   • Heartburn   • Abdominal Pain   • Diverticulosis       ENDO PROCEDURE ORDERED: COLON recurrent diverticulitis    Subjective    Grover Lee is a 44 y.o. male. he is here today for follow-up.    History of Present Illness    Patient was seen on a recheck of his GERD, epigastric pain, recurrent diverticulitis.  Last seen 09/09/2019.  Patient states he has not had any major flares since his last visit.  He has not had to take any of the Flagyl.  He is on Dexilant 60 mg daily for chronic heartburn.  Denies nausea, vomiting, dysphagia.  Weight is up 8 pounds since last visit.  Last colonoscopy 04/07/2017.  We had previously wanted to repeat a colonoscopy, but he had declined in the past.    Laboratories 03/09/2020:  CMP showed glucose 114, ALT 55, otherwise normal.  CBC normal.    ASSESSMENT/PLAN:  Patient with chronic GERD, recurrent diverticulitis, recommend colonoscopy with biopsies.  Mild elevation in ALT, likely related to fatty liver.  Encouraged dietary modification and weight loss.  We will plan follow up after the above, further pending clinical course and the results of the above.      The following portions of the patient's history were reviewed and updated as appropriate:   Past Medical History:   Diagnosis Date   • Diverticulitis    • GERD (gastroesophageal reflux disease)    • Pleurisy      Past Surgical History:   Procedure Laterality Date   • COLONOSCOPY N/A 4/7/2017    Procedure: COLONOSCOPY;  Surgeon: Grover Pearson MD;  Location: [facility] ENDOSCOPY;  Service:    • ENDOSCOPY N/A 4/7/2017    Procedure: ESOPHAGOGASTRODUODENOSCOPY;  Surgeon: Grover Pearson MD;  Location: [facility] ENDOSCOPY;  Service:    • HERNIA REPAIR     • KNEE ARTHROSCOPY     • UPPER GASTROINTESTINAL ENDOSCOPY  04/07/2017   • VASECTOMY       Family History   Problem Relation Age of Onset   • Diverticulitis Mother    • No Known Problems Father        No Known Allergies  Social History  "    Socioeconomic History   • Marital status:      Spouse name: Not on file   • Number of children: Not on file   • Years of education: Not on file   • Highest education level: Not on file   Tobacco Use   • Smoking status: Former Smoker     Types: Cigarettes   • Smokeless tobacco: Current User     Types: Chew   • Tobacco comment: 05/07/2018 - Patient reports when he utilized Cigarettes, he would utiize 1 pack of Cigarettes per week.  Patient reports he partakes of a Cigar occasionally, and currently utilizes chewing tobacco.   Substance and Sexual Activity   • Alcohol use: No   • Drug use: No   • Sexual activity: Defer     Current Medications:  Prior to Admission medications    Medication Sig Start Date End Date Taking? Authorizing Provider   dexlansoprazole (DEXILANT) 60 MG capsule Take 1 capsule by mouth Daily. 3/7/19  Yes Quincy Yousif PA-C   ibuprofen (ADVIL,MOTRIN) 800 MG tablet Take 800 mg by mouth As Needed. 1/3/18  Yes ProviderMaicol MD   LORazepam (ATIVAN) 1 MG tablet TAKE 1 TABLET BY MOUTH THREE TIMES A DAY OR LESS FOR NERVOUSNESS 10/5/18  Yes ProviderMaicol MD   metroNIDAZOLE (FLAGYL) 500 MG tablet Take 500 mg by mouth As Needed.   Yes ProviderMaicol MD   ondansetron ODT (ZOFRAN-ODT) 4 MG disintegrating tablet Take 1 tablet by mouth Every 8 (Eight) Hours As Needed for Nausea or Vomiting. 7/9/19  Yes Quincy Yousif PA-C     Review of Systems  Review of Systems       Objective    /82 (BP Location: Left arm)   Pulse 74   Ht 177.8 cm (70\")   Wt 104 kg (230 lb)   BMI 33.00 kg/m²   Physical Exam   Constitutional: He is oriented to person, place, and time. He appears well-developed and well-nourished. No distress.   HENT:   Head: Normocephalic and atraumatic.   Eyes: Pupils are equal, round, and reactive to light. EOM are normal.   Neck: Normal range of motion.   Cardiovascular: Normal rate, regular rhythm and normal heart sounds.   Pulmonary/Chest: Effort normal and " breath sounds normal.   Abdominal: Soft. Bowel sounds are normal. He exhibits no shifting dullness, no distension, no abdominal bruit, no ascites and no mass. There is no hepatosplenomegaly. There is tenderness. There is no rigidity, no rebound, no guarding and no CVA tenderness. No hernia. Hernia confirmed negative in the ventral area.   mild   Musculoskeletal: Normal range of motion.   Neurological: He is alert and oriented to person, place, and time.   Skin: Skin is warm and dry.   Psychiatric: He has a normal mood and affect. His behavior is normal. Judgment and thought content normal.   Nursing note and vitals reviewed.    Assessment/Plan      1. Gastroesophageal reflux disease with esophagitis    2. Diverticulosis of large intestine without hemorrhage    3. History of diverticulitis    .   Grover was seen today for heartburn, abdominal pain and diverticulosis.    Diagnoses and all orders for this visit:    Gastroesophageal reflux disease with esophagitis    Diverticulosis of large intestine without hemorrhage  -     Case Request; Standing  -     Case Request    History of diverticulitis  -     Case Request; Standing  -     Case Request    Other orders  -     Follow Anesthesia Guidelines / Standing Orders; Future  -     Obtain Informed Consent; Future  -     sodium-potassium-magnesium sulfates (SUPREP BOWEL PREP KIT) 17.5-3.13-1.6 GM/177ML solution oral solution; As directed per instruction sheet for colonoscopy        Orders placed during this encounter include:  Orders Placed This Encounter   Procedures   • Follow Anesthesia Guidelines / Standing Orders     Standing Status:   Future   • Obtain Informed Consent     Standing Status:   Future     Order Specific Question:   Informed Consent Given For     Answer:   COLONOSCOPY       Medications prescribed:  New Medications Ordered This Visit   Medications   • sodium-potassium-magnesium sulfates (SUPREP BOWEL PREP KIT) 17.5-3.13-1.6 GM/177ML solution oral solution      Sig: As directed per instruction sheet for colonoscopy     Dispense:  2 bottle     Refill:  0       Requested Prescriptions     Signed Prescriptions Disp Refills   • sodium-potassium-magnesium sulfates (SUPREP BOWEL PREP KIT) 17.5-3.13-1.6 GM/177ML solution oral solution 2 bottle 0     Sig: As directed per instruction sheet for colonoscopy       Review and/or summary of lab tests, radiology, procedures, medications. Review and summary of old records and obtaining of history. The risks and benefits of my recommendations, as well as other treatment options were discussed with the patient today. Questions were answered.    Follow-up: Return if symptoms worsen or fail to improve, for After the above.     COLONOSCOPY (N/A)      This document has been electronically signed by Quincy Yousif PA-C on March 9, 2020 6:05 PM      Results for orders placed or performed in visit on 03/09/20   CBC Auto Differential   Result Value Ref Range    WBC 7.70 3.40 - 10.80 10*3/mm3    RBC 4.87 4.14 - 5.80 10*6/mm3    Hemoglobin 14.4 13.0 - 17.7 g/dL    Hematocrit 42.3 37.5 - 51.0 %    MCV 86.9 79.0 - 97.0 fL    MCH 29.6 26.6 - 33.0 pg    MCHC 34.0 31.5 - 35.7 g/dL    RDW 12.6 12.3 - 15.4 %    RDW-SD 39.5 37.0 - 54.0 fl    MPV 9.5 6.0 - 12.0 fL    Platelets 246 140 - 450 10*3/mm3    Neutrophil % 52.0 42.7 - 76.0 %    Lymphocyte % 35.5 19.6 - 45.3 %    Monocyte % 8.3 5.0 - 12.0 %    Eosinophil % 3.8 0.3 - 6.2 %    Basophil % 0.4 0.0 - 1.5 %    Neutrophils, Absolute 4.01 1.70 - 7.00 10*3/mm3    Lymphocytes, Absolute 2.73 0.70 - 3.10 10*3/mm3    Monocytes, Absolute 0.64 0.10 - 0.90 10*3/mm3    Eosinophils, Absolute 0.29 0.00 - 0.40 10*3/mm3    Basophils, Absolute 0.03 0.00 - 0.20 10*3/mm3   Comprehensive Metabolic Panel   Result Value Ref Range    Glucose 114 (H) 70 - 99 mg/dL    BUN 13 7 - 23 mg/dL    Creatinine 1.21 0.70 - 1.30 mg/dL    Sodium 140 137 - 145 mmol/L    Potassium 4.3 3.4 - 5.0 mmol/L    Chloride 104 101 - 112 mmol/L    CO2  25.0 22.0 - 30.0 mmol/L    Calcium 9.3 8.4 - 10.2 mg/dL    Total Protein 7.5 6.3 - 8.6 g/dL    Albumin 4.30 3.50 - 5.00 g/dL    ALT (SGPT) 55 (H) <=50 U/L    AST (SGOT) 42 17 - 59 U/L    Alkaline Phosphatase 48 38 - 126 U/L    Total Bilirubin 0.3 0.2 - 1.3 mg/dL    eGFR Non  Amer 65 63 - 147 mL/min/1.73    Globulin 3.2 2.3 - 3.5 gm/dL    A/G Ratio 1.3 1.1 - 1.8 g/dL    BUN/Creatinine Ratio 10.7 7.0 - 25.0    Anion Gap 11.0 5.0 - 15.0 mmol/L   Results for orders placed or performed in visit on 07/09/19   CBC Auto Differential   Result Value Ref Range    WBC 12.04 (H) 3.40 - 10.80 10*3/mm3    RBC 4.96 4.14 - 5.80 10*6/mm3    Hemoglobin 15.3 13.0 - 17.7 g/dL    Hematocrit 43.6 37.5 - 51.0 %    MCV 87.9 79.0 - 97.0 fL    MCH 30.8 26.6 - 33.0 pg    MCHC 35.1 31.5 - 35.7 g/dL    RDW 12.5 12.3 - 15.4 %    RDW-SD 39.4 37.0 - 54.0 fl    MPV 9.1 6.0 - 12.0 fL    Platelets 228 140 - 450 10*3/mm3    Neutrophil % 74.2 42.7 - 76.0 %    Lymphocyte % 17.0 (L) 19.6 - 45.3 %    Monocyte % 7.8 5.0 - 12.0 %    Eosinophil % 0.9 0.3 - 6.2 %    Basophil % 0.1 0.0 - 1.5 %    Neutrophils, Absolute 8.93 (H) 1.70 - 7.00 10*3/mm3    Lymphocytes, Absolute 2.05 0.70 - 3.10 10*3/mm3    Monocytes, Absolute 0.94 (H) 0.10 - 0.90 10*3/mm3    Eosinophils, Absolute 0.11 0.00 - 0.40 10*3/mm3    Basophils, Absolute 0.01 0.00 - 0.20 10*3/mm3   Results for orders placed or performed during the hospital encounter of 06/04/18   Gold Top - [facility]   Result Value Ref Range    Extra Tube Hold for add-ons.    Green Top (Gel)   Result Value Ref Range    Extra Tube Hold for add-ons.    CBC Auto Differential   Result Value Ref Range    WBC 7.03 3.20 - 9.80 10*3/mm3    RBC 4.96 4.37 - 5.74 10*6/mm3    Hemoglobin 14.9 13.7 - 17.3 g/dL    Hematocrit 42.8 39.0 - 49.0 %    MCV 86.3 80.0 - 98.0 fL    MCH 30.0 26.5 - 34.0 pg    MCHC 34.8 31.5 - 36.3 g/dL    RDW 12.5 11.5 - 14.5 %    RDW-SD 40.0 35.1 - 43.9 fl    MPV 9.6 8.0 - 12.0 fL    Platelets 227 150 - 450 10*3/mm3     Neutrophil % 60.6 37.0 - 80.0 %    Lymphocyte % 29.0 10.0 - 50.0 %    Monocyte % 8.5 0.0 - 12.0 %    Eosinophil % 1.7 0.0 - 7.0 %    Basophil % 0.1 0.0 - 2.0 %    Immature Grans % 0.1 0.0 - 0.5 %    Neutrophils, Absolute 4.25 2.00 - 8.60 10*3/mm3    Lymphocytes, Absolute 2.04 0.60 - 4.20 10*3/mm3    Monocytes, Absolute 0.60 0.00 - 0.90 10*3/mm3    Eosinophils, Absolute 0.12 0.00 - 0.70 10*3/mm3    Basophils, Absolute 0.01 0.00 - 0.20 10*3/mm3    Immature Grans, Absolute 0.01 0.00 - 0.02 10*3/mm3   Lavender Top   Result Value Ref Range    Extra Tube hold for add-on    Light Blue Top   Result Value Ref Range    Extra Tube hold for add-on    Troponin   Result Value Ref Range    Troponin I <0.012 <=0.034 ng/mL   Protime-INR   Result Value Ref Range    Protime 13.1 11.1 - 15.3 Seconds    INR 1.01 0.80 - 1.20   BNP   Result Value Ref Range    proBNP 33.2 0.0 - 450.0 pg/mL   Comprehensive Metabolic Panel   Result Value Ref Range    Glucose 93 60 - 100 mg/dL    BUN 11 7 - 21 mg/dL    Creatinine 0.95 0.70 - 1.30 mg/dL    Sodium 141 137 - 145 mmol/L    Potassium 4.1 3.5 - 5.1 mmol/L    Chloride 105 95 - 110 mmol/L    CO2 25.0 22.0 - 31.0 mmol/L    Calcium 9.0 8.4 - 10.2 mg/dL    Total Protein 7.4 6.3 - 8.6 g/dL    Albumin 4.40 3.40 - 4.80 g/dL    ALT (SGPT) 50 21 - 72 U/L    AST (SGOT) 35 17 - 59 U/L    Alkaline Phosphatase 45 38 - 126 U/L    Total Bilirubin 0.4 0.2 - 1.3 mg/dL    eGFR Non  Amer 86 63 - 147 mL/min/1.73    Globulin 3.0 2.3 - 3.5 gm/dL    A/G Ratio 1.5 1.1 - 1.8 g/dL    BUN/Creatinine Ratio 11.6 7.0 - 25.0    Anion Gap 11.0 5.0 - 15.0 mmol/L     *Note: Due to a large number of results and/or encounters for the requested time period, some results have not been displayed. A complete set of results can be found in Results Review.       Some portions of this note have been dictated using voice recognition software and may contain errors and/or omissions.

## 2020-03-09 NOTE — PATIENT INSTRUCTIONS

## 2020-05-26 RX ORDER — DEXLANSOPRAZOLE 60 MG/1
60 CAPSULE, DELAYED RELEASE ORAL DAILY
Qty: 90 CAPSULE | Refills: 0 | Status: SHIPPED | OUTPATIENT
Start: 2020-05-26 | End: 2020-05-29

## 2020-05-29 RX ORDER — DEXLANSOPRAZOLE 60 MG/1
CAPSULE, DELAYED RELEASE ORAL
Qty: 90 CAPSULE | Refills: 0 | Status: SHIPPED | OUTPATIENT
Start: 2020-05-29 | End: 2020-11-20

## 2020-07-12 LAB — SARS-COV-2 N GENE RESP QL NAA+PROBE: NOT DETECTED

## 2020-07-12 PROCEDURE — C9803 HOPD COVID-19 SPEC COLLECT: HCPCS | Performed by: INTERNAL MEDICINE

## 2020-07-12 PROCEDURE — 87635 SARS-COV-2 COVID-19 AMP PRB: CPT | Performed by: INTERNAL MEDICINE

## 2020-07-15 ENCOUNTER — ANESTHESIA EVENT (OUTPATIENT)
Dept: GASTROENTEROLOGY | Facility: HOSPITAL | Age: 45
End: 2020-07-15

## 2020-07-15 ENCOUNTER — HOSPITAL ENCOUNTER (OUTPATIENT)
Facility: HOSPITAL | Age: 45
Setting detail: HOSPITAL OUTPATIENT SURGERY
Discharge: HOME | End: 2020-07-15
Attending: INTERNAL MEDICINE | Admitting: INTERNAL MEDICINE

## 2020-07-15 ENCOUNTER — ANESTHESIA (OUTPATIENT)
Dept: GASTROENTEROLOGY | Facility: HOSPITAL | Age: 45
End: 2020-07-15

## 2020-07-15 VITALS
DIASTOLIC BLOOD PRESSURE: 61 MMHG | WEIGHT: 221 LBS | BODY MASS INDEX: 31.64 KG/M2 | SYSTOLIC BLOOD PRESSURE: 104 MMHG | HEIGHT: 70 IN | OXYGEN SATURATION: 96 % | HEART RATE: 73 BPM | RESPIRATION RATE: 18 BRPM | TEMPERATURE: 96.8 F

## 2020-07-15 DIAGNOSIS — Z87.19 HISTORY OF DIVERTICULITIS: ICD-10-CM

## 2020-07-15 DIAGNOSIS — K57.30 DIVERTICULOSIS OF LARGE INTESTINE WITHOUT HEMORRHAGE: ICD-10-CM

## 2020-07-15 PROCEDURE — 25010000002 PROPOFOL 10 MG/ML EMULSION: Performed by: NURSE ANESTHETIST, CERTIFIED REGISTERED

## 2020-07-15 PROCEDURE — 45380 COLONOSCOPY AND BIOPSY: CPT | Performed by: INTERNAL MEDICINE

## 2020-07-15 RX ORDER — ONDANSETRON 2 MG/ML
4 INJECTION INTRAMUSCULAR; INTRAVENOUS ONCE AS NEEDED
Status: DISCONTINUED | OUTPATIENT
Start: 2020-07-15 | End: 2020-07-15 | Stop reason: HOSPADM

## 2020-07-15 RX ORDER — MEPERIDINE HYDROCHLORIDE 25 MG/ML
12.5 INJECTION INTRAMUSCULAR; INTRAVENOUS; SUBCUTANEOUS
Status: DISCONTINUED | OUTPATIENT
Start: 2020-07-15 | End: 2020-07-15 | Stop reason: HOSPADM

## 2020-07-15 RX ORDER — DEXTROSE AND SODIUM CHLORIDE 5; .45 G/100ML; G/100ML
30 INJECTION, SOLUTION INTRAVENOUS CONTINUOUS PRN
Status: DISCONTINUED | OUTPATIENT
Start: 2020-07-15 | End: 2020-07-15 | Stop reason: HOSPADM

## 2020-07-15 RX ORDER — PROMETHAZINE HYDROCHLORIDE 25 MG/ML
12.5 INJECTION, SOLUTION INTRAMUSCULAR; INTRAVENOUS ONCE AS NEEDED
Status: DISCONTINUED | OUTPATIENT
Start: 2020-07-15 | End: 2020-07-15 | Stop reason: HOSPADM

## 2020-07-15 RX ORDER — PROMETHAZINE HYDROCHLORIDE 25 MG/1
25 TABLET ORAL ONCE AS NEEDED
Status: DISCONTINUED | OUTPATIENT
Start: 2020-07-15 | End: 2020-07-15 | Stop reason: HOSPADM

## 2020-07-15 RX ORDER — LIDOCAINE HYDROCHLORIDE 20 MG/ML
INJECTION, SOLUTION INTRAVENOUS AS NEEDED
Status: DISCONTINUED | OUTPATIENT
Start: 2020-07-15 | End: 2020-07-15 | Stop reason: SURG

## 2020-07-15 RX ORDER — PROPOFOL 10 MG/ML
VIAL (ML) INTRAVENOUS AS NEEDED
Status: DISCONTINUED | OUTPATIENT
Start: 2020-07-15 | End: 2020-07-15 | Stop reason: SURG

## 2020-07-15 RX ORDER — PROMETHAZINE HYDROCHLORIDE 25 MG/1
25 SUPPOSITORY RECTAL ONCE AS NEEDED
Status: DISCONTINUED | OUTPATIENT
Start: 2020-07-15 | End: 2020-07-15 | Stop reason: HOSPADM

## 2020-07-15 RX ADMIN — PROPOFOL 50 MG: 10 INJECTION, EMULSION INTRAVENOUS at 11:02

## 2020-07-15 RX ADMIN — DEXTROSE AND SODIUM CHLORIDE 30 ML/HR: 5; 450 INJECTION, SOLUTION INTRAVENOUS at 10:15

## 2020-07-15 RX ADMIN — LIDOCAINE HYDROCHLORIDE 50 MG: 20 INJECTION, SOLUTION INTRAVENOUS at 10:54

## 2020-07-15 RX ADMIN — PROPOFOL 50 MG: 10 INJECTION, EMULSION INTRAVENOUS at 11:00

## 2020-07-15 RX ADMIN — PROPOFOL 50 MG: 10 INJECTION, EMULSION INTRAVENOUS at 10:54

## 2020-07-15 RX ADMIN — PROPOFOL 50 MG: 10 INJECTION, EMULSION INTRAVENOUS at 10:57

## 2020-07-15 NOTE — ANESTHESIA POSTPROCEDURE EVALUATION
Patient: Grover Lee    Procedure Summary     Date:  07/15/20 Room / Location:  [facility] ENDOSCOPY 1 / [facility] ENDOSCOPY    Anesthesia Start:  1052 Anesthesia Stop:  1106    Procedure:  COLONOSCOPY (N/A ) Diagnosis:       Diverticulosis of large intestine without hemorrhage      History of diverticulitis      (Diverticulosis of large intestine without hemorrhage [K57.30])      (History of diverticulitis [Z87.19])    Surgeon:  Grover Pearson MD Provider:  Kashmir Sanchez CRNA    Anesthesia Type:  MAC ASA Status:  2          Anesthesia Type: MAC    Vitals  No vitals data found for the desired time range.          Post Anesthesia Care and Evaluation    Patient location during evaluation: bedside  Patient participation: complete - patient cannot participate  Level of consciousness: awake  Pain score: 0  Pain management: adequate  Airway patency: patent  Anesthetic complications: No anesthetic complications  PONV Status: none  Cardiovascular status: acceptable  Respiratory status: acceptable  Hydration status: acceptable

## 2020-07-15 NOTE — H&P
No chief complaint on file.      ENDO PROCEDURE ORDERED: COLON recurrent diverticulitis    Subjective    Grover Lee is a 44 y.o. male. he is here today for follow-up.    History of Present Illness    Patient was seen on a recheck of his GERD, epigastric pain, recurrent diverticulitis.  Last seen 09/09/2019.  Patient states he has not had any major flares since his last visit.  He has not had to take any of the Flagyl.  He is on Dexilant 60 mg daily for chronic heartburn.  Denies nausea, vomiting, dysphagia.  Weight is up 8 pounds since last visit.  Last colonoscopy 04/07/2017.  We had previously wanted to repeat a colonoscopy, but he had declined in the past.    Laboratories 03/09/2020:  CMP showed glucose 114, ALT 55, otherwise normal.  CBC normal.    ASSESSMENT/PLAN:  Patient with chronic GERD, recurrent diverticulitis, recommend colonoscopy with biopsies.  Mild elevation in ALT, likely related to fatty liver.  Encouraged dietary modification and weight loss.  We will plan follow up after the above, further pending clinical course and the results of the above.      The following portions of the patient's history were reviewed and updated as appropriate:   Past Medical History:   Diagnosis Date   • Diverticulitis    • GERD (gastroesophageal reflux disease)    • Pleurisy      Past Surgical History:   Procedure Laterality Date   • COLONOSCOPY N/A 4/7/2017    Procedure: COLONOSCOPY;  Surgeon: Grover Pearson MD;  Location: [facility] ENDOSCOPY;  Service:    • ENDOSCOPY N/A 4/7/2017    Procedure: ESOPHAGOGASTRODUODENOSCOPY;  Surgeon: Grover Pearson MD;  Location: [facility] ENDOSCOPY;  Service:    • HERNIA REPAIR     • KNEE ARTHROSCOPY     • UPPER GASTROINTESTINAL ENDOSCOPY  04/07/2017   • VASECTOMY       Family History   Problem Relation Age of Onset   • Diverticulitis Mother    • No Known Problems Father        No Known Allergies  Social History     Socioeconomic History   • Marital status:      Spouse  "name: Not on file   • Number of children: Not on file   • Years of education: Not on file   • Highest education level: Not on file   Tobacco Use   • Smoking status: Current Every Day Smoker     Packs/day: 0.25     Types: Cigarettes   • Smokeless tobacco: Current User     Types: Chew   • Tobacco comment: 7/10/20 pt stated he smokes 3 per day    Substance and Sexual Activity   • Alcohol use: No   • Drug use: No   • Sexual activity: Defer     Current Medications:  Prior to Admission medications    Medication Sig Start Date End Date Taking? Authorizing Provider   dexlansoprazole (DEXILANT) 60 MG capsule Take 1 capsule by mouth Daily. 3/7/19  Yes Quincy Yousif PA-C   ibuprofen (ADVIL,MOTRIN) 800 MG tablet Take 800 mg by mouth As Needed. 1/3/18  Yes Maicol Espinosa MD   LORazepam (ATIVAN) 1 MG tablet TAKE 1 TABLET BY MOUTH THREE TIMES A DAY OR LESS FOR NERVOUSNESS 10/5/18  Yes ProviderMaicol MD   metroNIDAZOLE (FLAGYL) 500 MG tablet Take 500 mg by mouth As Needed.   Yes ProviderMaicol MD   ondansetron ODT (ZOFRAN-ODT) 4 MG disintegrating tablet Take 1 tablet by mouth Every 8 (Eight) Hours As Needed for Nausea or Vomiting. 7/9/19  Yes Quincy Yousif PA-C     Review of Systems  Review of Systems       Objective    /94   Pulse 74   Temp 97.2 °F (36.2 °C)   Resp 18   Ht 177.8 cm (70\")   Wt 100 kg (221 lb)   SpO2 96%   BMI 31.71 kg/m²   Physical Exam   Constitutional: He is oriented to person, place, and time. He appears well-developed and well-nourished. No distress.   HENT:   Head: Normocephalic and atraumatic.   Eyes: Pupils are equal, round, and reactive to light. EOM are normal.   Neck: Normal range of motion.   Cardiovascular: Normal rate, regular rhythm and normal heart sounds.   Pulmonary/Chest: Effort normal and breath sounds normal.   Abdominal: Soft. Bowel sounds are normal. He exhibits no shifting dullness, no distension, no abdominal bruit, no ascites and no mass. There is " no hepatosplenomegaly. There is tenderness. There is no rigidity, no rebound, no guarding and no CVA tenderness. No hernia. Hernia confirmed negative in the ventral area.   mild   Musculoskeletal: Normal range of motion.   Neurological: He is alert and oriented to person, place, and time.   Skin: Skin is warm and dry.   Psychiatric: He has a normal mood and affect. His behavior is normal. Judgment and thought content normal.   Nursing note and vitals reviewed.    Assessment/Plan      1. Diverticulosis of large intestine without hemorrhage    2. History of diverticulitis    .   Grover was seen today for heartburn, abdominal pain and diverticulosis.    Diagnoses and all orders for this visit:    Gastroesophageal reflux disease with esophagitis    Diverticulosis of large intestine without hemorrhage  -     Case Request; Standing  -     Case Request    History of diverticulitis  -     Case Request; Standing  -     Case Request    Other orders  -     Follow Anesthesia Guidelines / Standing Orders; Future  -     Obtain Informed Consent; Future  -     sodium-potassium-magnesium sulfates (SUPREP BOWEL PREP KIT) 17.5-3.13-1.6 GM/177ML solution oral solution; As directed per instruction sheet for colonoscopy        Orders placed during this encounter include:  Orders Placed This Encounter   Procedures   • COVID PRE-OP / PRE-PROCEDURE SCREENING ORDER (NO ISOLATION) - Swab, Nasopharynx     Standing Status:   Standing     Number of Occurrences:   1     Order Specific Question:   Please select your location:     Answer:   [facility]     Order Specific Question:   COVID Screening Order:     Answer:   EMERGENCY PRE-OP PATIENTS ONLY:  [facility] IN-HOUSE, NP SWAB IN TRANSPORT MEDIA 8-10 HR TAT [[id number]]   • COVID-19,  [facility] IN-HOUSE, NP SWAB IN TRANSPORT MEDIA 8-10 HR TAT - Swab, Nasopharynx     Standing Status:   Standing     Number of Occurrences:   1   • Obtain Informed Consent     Standing Status:   Standing     Number of  Occurrences:   1     Order Specific Question:   Informed Consent Given For     Answer:   COLONOSCOPY   • POC Glucose Once     Prior to Procedure on ALL Diabetic Patients     Standing Status:   Standing     Number of Occurrences:   1   • Insert Peripheral IV     Standing Status:   Standing     Number of Occurrences:   1       Medications prescribed:  New Medications Ordered This Visit   Medications   • dextrose 5 % and sodium chloride 0.45 % infusion       Requested Prescriptions     Signed Prescriptions Disp Refills   • sodium-potassium-magnesium sulfates (SUPREP BOWEL PREP KIT) 17.5-3.13-1.6 GM/177ML solution oral solution 2 bottle 0     Sig: As directed per instruction sheet for colonoscopy       Review and/or summary of lab tests, radiology, procedures, medications. Review and summary of old records and obtaining of history. The risks and benefits of my recommendations, as well as other treatment options were discussed with the patient today. Questions were answered.    Follow-up: No follow-ups on file.     COLONOSCOPY (N/A)      This document has been electronically signed by Grover Pearson MD on July 15, 2020 10:10      Results for orders placed or performed during the hospital encounter of 07/15/20   COVID-19, [facility] IN-HOUSE, NP SWAB IN TRANSPORT MEDIA 8-10 HR TAT - Swab, Nasopharynx   Result Value Ref Range    COVID19 Not Detected Not Detected - Ref. Range   Results for orders placed or performed in visit on 03/09/20   CBC Auto Differential   Result Value Ref Range    WBC 7.70 3.40 - 10.80 10*3/mm3    RBC 4.87 4.14 - 5.80 10*6/mm3    Hemoglobin 14.4 13.0 - 17.7 g/dL    Hematocrit 42.3 37.5 - 51.0 %    MCV 86.9 79.0 - 97.0 fL    MCH 29.6 26.6 - 33.0 pg    MCHC 34.0 31.5 - 35.7 g/dL    RDW 12.6 12.3 - 15.4 %    RDW-SD 39.5 37.0 - 54.0 fl    MPV 9.5 6.0 - 12.0 fL    Platelets 246 140 - 450 10*3/mm3    Neutrophil % 52.0 42.7 - 76.0 %    Lymphocyte % 35.5 19.6 - 45.3 %    Monocyte % 8.3 5.0 - 12.0 %     Eosinophil % 3.8 0.3 - 6.2 %    Basophil % 0.4 0.0 - 1.5 %    Neutrophils, Absolute 4.01 1.70 - 7.00 10*3/mm3    Lymphocytes, Absolute 2.73 0.70 - 3.10 10*3/mm3    Monocytes, Absolute 0.64 0.10 - 0.90 10*3/mm3    Eosinophils, Absolute 0.29 0.00 - 0.40 10*3/mm3    Basophils, Absolute 0.03 0.00 - 0.20 10*3/mm3   Comprehensive Metabolic Panel   Result Value Ref Range    Glucose 114 (H) 70 - 99 mg/dL    BUN 13 7 - 23 mg/dL    Creatinine 1.21 0.70 - 1.30 mg/dL    Sodium 140 137 - 145 mmol/L    Potassium 4.3 3.4 - 5.0 mmol/L    Chloride 104 101 - 112 mmol/L    CO2 25.0 22.0 - 30.0 mmol/L    Calcium 9.3 8.4 - 10.2 mg/dL    Total Protein 7.5 6.3 - 8.6 g/dL    Albumin 4.30 3.50 - 5.00 g/dL    ALT (SGPT) 55 (H) <=50 U/L    AST (SGOT) 42 17 - 59 U/L    Alkaline Phosphatase 48 38 - 126 U/L    Total Bilirubin 0.3 0.2 - 1.3 mg/dL    eGFR Non  Amer 65 63 - 147 mL/min/1.73    Globulin 3.2 2.3 - 3.5 gm/dL    A/G Ratio 1.3 1.1 - 1.8 g/dL    BUN/Creatinine Ratio 10.7 7.0 - 25.0    Anion Gap 11.0 5.0 - 15.0 mmol/L   Results for orders placed or performed in visit on 07/09/19   CBC Auto Differential   Result Value Ref Range    WBC 12.04 (H) 3.40 - 10.80 10*3/mm3    RBC 4.96 4.14 - 5.80 10*6/mm3    Hemoglobin 15.3 13.0 - 17.7 g/dL    Hematocrit 43.6 37.5 - 51.0 %    MCV 87.9 79.0 - 97.0 fL    MCH 30.8 26.6 - 33.0 pg    MCHC 35.1 31.5 - 35.7 g/dL    RDW 12.5 12.3 - 15.4 %    RDW-SD 39.4 37.0 - 54.0 fl    MPV 9.1 6.0 - 12.0 fL    Platelets 228 140 - 450 10*3/mm3    Neutrophil % 74.2 42.7 - 76.0 %    Lymphocyte % 17.0 (L) 19.6 - 45.3 %    Monocyte % 7.8 5.0 - 12.0 %    Eosinophil % 0.9 0.3 - 6.2 %    Basophil % 0.1 0.0 - 1.5 %    Neutrophils, Absolute 8.93 (H) 1.70 - 7.00 10*3/mm3    Lymphocytes, Absolute 2.05 0.70 - 3.10 10*3/mm3    Monocytes, Absolute 0.94 (H) 0.10 - 0.90 10*3/mm3    Eosinophils, Absolute 0.11 0.00 - 0.40 10*3/mm3    Basophils, Absolute 0.01 0.00 - 0.20 10*3/mm3   Results for orders placed or performed during the  hospital encounter of 06/04/18   Gold Top - SST   Result Value Ref Range    Extra Tube Hold for add-ons.    Green Top (Gel)   Result Value Ref Range    Extra Tube Hold for add-ons.    CBC Auto Differential   Result Value Ref Range    WBC 7.03 3.20 - 9.80 10*3/mm3    RBC 4.96 4.37 - 5.74 10*6/mm3    Hemoglobin 14.9 13.7 - 17.3 g/dL    Hematocrit 42.8 39.0 - 49.0 %    MCV 86.3 80.0 - 98.0 fL    MCH 30.0 26.5 - 34.0 pg    MCHC 34.8 31.5 - 36.3 g/dL    RDW 12.5 11.5 - 14.5 %    RDW-SD 40.0 35.1 - 43.9 fl    MPV 9.6 8.0 - 12.0 fL    Platelets 227 150 - 450 10*3/mm3    Neutrophil % 60.6 37.0 - 80.0 %    Lymphocyte % 29.0 10.0 - 50.0 %    Monocyte % 8.5 0.0 - 12.0 %    Eosinophil % 1.7 0.0 - 7.0 %    Basophil % 0.1 0.0 - 2.0 %    Immature Grans % 0.1 0.0 - 0.5 %    Neutrophils, Absolute 4.25 2.00 - 8.60 10*3/mm3    Lymphocytes, Absolute 2.04 0.60 - 4.20 10*3/mm3    Monocytes, Absolute 0.60 0.00 - 0.90 10*3/mm3    Eosinophils, Absolute 0.12 0.00 - 0.70 10*3/mm3    Basophils, Absolute 0.01 0.00 - 0.20 10*3/mm3    Immature Grans, Absolute 0.01 0.00 - 0.02 10*3/mm3   Lavender Top   Result Value Ref Range    Extra Tube hold for add-on    Light Blue Top   Result Value Ref Range    Extra Tube hold for add-on    Troponin   Result Value Ref Range    Troponin I <0.012 <=0.034 ng/mL   Protime-INR   Result Value Ref Range    Protime 13.1 11.1 - 15.3 Seconds    INR 1.01 0.80 - 1.20   BNP   Result Value Ref Range    proBNP 33.2 0.0 - 450.0 pg/mL   Comprehensive Metabolic Panel   Result Value Ref Range    Glucose 93 60 - 100 mg/dL    BUN 11 7 - 21 mg/dL    Creatinine 0.95 0.70 - 1.30 mg/dL    Sodium 141 137 - 145 mmol/L    Potassium 4.1 3.5 - 5.1 mmol/L    Chloride 105 95 - 110 mmol/L    CO2 25.0 22.0 - 31.0 mmol/L    Calcium 9.0 8.4 - 10.2 mg/dL    Total Protein 7.4 6.3 - 8.6 g/dL    Albumin 4.40 3.40 - 4.80 g/dL    ALT (SGPT) 50 21 - 72 U/L    AST (SGOT) 35 17 - 59 U/L    Alkaline Phosphatase 45 38 - 126 U/L    Total Bilirubin 0.4 0.2  - 1.3 mg/dL    eGFR Non  Amer 86 63 - 147 mL/min/1.73    Globulin 3.0 2.3 - 3.5 gm/dL    A/G Ratio 1.5 1.1 - 1.8 g/dL    BUN/Creatinine Ratio 11.6 7.0 - 25.0    Anion Gap 11.0 5.0 - 15.0 mmol/L     *Note: Due to a large number of results and/or encounters for the requested time period, some results have not been displayed. A complete set of results can be found in Results Review.       Some portions of this note have been dictated using voice recognition software and may contain errors and/or omissions.

## 2020-07-15 NOTE — ANESTHESIA PREPROCEDURE EVALUATION
Anesthesia Evaluation     NPO Solid Status: > 8 hours  NPO Liquid Status: > 8 hours           Airway   Mallampati: III  TM distance: >3 FB  Neck ROM: full  No difficulty expected  Dental - normal exam     Pulmonary - normal exam   Cardiovascular - normal exam        Neuro/Psych  GI/Hepatic/Renal/Endo    (+)  GERD,      Musculoskeletal     Abdominal    Substance History      OB/GYN          Other                        Anesthesia Plan    ASA 2     MAC     intravenous induction     Anesthetic plan, all risks, benefits, and alternatives have been provided, discussed and informed consent has been obtained with: patient.

## 2020-07-15 NOTE — NURSING NOTE
Called pts mother to let her know that pt is in preop and that we would call her when he is in recovery

## 2020-07-20 LAB
LAB AP CASE REPORT: NORMAL
PATH REPORT.FINAL DX SPEC: NORMAL

## 2020-07-22 ENCOUNTER — OFFICE VISIT (OUTPATIENT)
Dept: GASTROENTEROLOGY | Facility: CLINIC | Age: 45
End: 2020-07-22

## 2020-07-22 VITALS
HEIGHT: 70 IN | DIASTOLIC BLOOD PRESSURE: 84 MMHG | HEART RATE: 73 BPM | BODY MASS INDEX: 32.41 KG/M2 | WEIGHT: 226.4 LBS | SYSTOLIC BLOOD PRESSURE: 126 MMHG

## 2020-07-22 DIAGNOSIS — K57.92 DIVERTICULITIS: Primary | ICD-10-CM

## 2020-07-22 DIAGNOSIS — K57.30 DIVERTICULOSIS OF LARGE INTESTINE WITHOUT HEMORRHAGE: ICD-10-CM

## 2020-07-22 DIAGNOSIS — K21.00 GASTROESOPHAGEAL REFLUX DISEASE WITH ESOPHAGITIS: ICD-10-CM

## 2020-07-22 DIAGNOSIS — K57.32 DIVERTICULITIS OF LARGE INTESTINE WITHOUT PERFORATION OR ABSCESS WITHOUT BLEEDING: ICD-10-CM

## 2020-07-22 PROCEDURE — 99214 OFFICE O/P EST MOD 30 MIN: CPT | Performed by: PHYSICIAN ASSISTANT

## 2020-07-22 RX ORDER — METRONIDAZOLE 500 MG/1
500 TABLET ORAL 2 TIMES DAILY
Qty: 20 TABLET | Refills: 0 | Status: SHIPPED | OUTPATIENT
Start: 2020-07-22

## 2020-07-22 RX ORDER — DICYCLOMINE HCL 20 MG
20 TABLET ORAL EVERY 6 HOURS
Qty: 90 TABLET | Refills: 1 | Status: SHIPPED | OUTPATIENT
Start: 2020-07-22 | End: 2020-11-09 | Stop reason: SINTOL

## 2020-07-22 RX ORDER — DICYCLOMINE HCL 20 MG
20 TABLET ORAL EVERY 6 HOURS
COMMUNITY
End: 2020-07-22 | Stop reason: SDUPTHER

## 2020-07-22 RX ORDER — CIPROFLOXACIN 500 MG/1
500 TABLET, FILM COATED ORAL 2 TIMES DAILY
Qty: 20 TABLET | Refills: 0 | Status: SHIPPED | OUTPATIENT
Start: 2020-07-22

## 2020-07-22 RX ORDER — ONDANSETRON 4 MG/1
4 TABLET, ORALLY DISINTEGRATING ORAL EVERY 8 HOURS PRN
Qty: 30 TABLET | Refills: 1 | Status: SHIPPED | OUTPATIENT
Start: 2020-07-22

## 2020-07-22 RX ORDER — MESALAMINE 800 MG/1
800 TABLET, DELAYED RELEASE ORAL 3 TIMES DAILY
Qty: 60 TABLET | Refills: 2 | Status: SHIPPED | OUTPATIENT
Start: 2020-07-22 | End: 2020-07-29

## 2020-07-22 RX ORDER — CIPROFLOXACIN 500 MG/1
500 TABLET, FILM COATED ORAL AS NEEDED
COMMUNITY
End: 2020-07-22 | Stop reason: SDUPTHER

## 2020-07-22 NOTE — PATIENT INSTRUCTIONS

## 2020-07-22 NOTE — PROGRESS NOTES
Chief Complaint   Patient presents with   • Heartburn   • Diverticulosis       ENDO PROCEDURE ORDERED:    Subjective    Grover Lee is a 44 y.o. male. he is here today for follow-up.    History of Present Illness    The patient is seen on a recheck of his GERD, diverticular disease. Last seen 03/09/2020. Laboratories at that time showed fairly normal CBC. CMP showed glucose 114, ALT 55, otherwise normal. The patient states that he has had at least 2 episodes of needing antibiotic since his last visit. He has been having a little bit more trouble lately with is diverticulitis. He did undergo colonoscopy on 07/15/2020. This showed some localized erythematous mucosa in the sigmoid colon. Multiple diverticula in sigmoid and descending colon. Biopsies were obtained from the terminal ileum were negative. Random colon biopsy was negative. Sigmoid biopsy showed some hyperplastic changes without dysplasia. Recommended repeat colonoscopy in no greater than 5 years.    The patient states his GERD is doing well on the Dexilant. He does have the Bentyl as needed for his abdominal pain. Bowels are without blood or mucus. Weight is down 3.6 pounds since last visit.    ASSESSMENT/PLAN: Patient with recurrent persistent diverticulitis and possibly he has chronic diverticulitis. I have had long discussions in the past. He has previously declined surgery, but has continued to have episodes of diverticulitis. He states he believes he was having mild flare when he had his colonoscopy. I did discuss with him a high fiber/diverticular diet. Given his persistent problems, I suggested starting him on Asacol 800 mg b.i.d. to see if this would reduce the severity and frequency of his episodes. I would consider repeat imaging, may need to consider surgical evaluation. Previously it was felt that his disease was fairly low and there were concerns of possible colostomy. He is still at increased risk for that. He did request refills on  Cipro and Bentyl. I encouraged him to contact me if he needs to take an antibiotic, will otherwise plan follow up in a few months, sooner if needed, further pending clinical course and the results of the above.      The following portions of the patient's history were reviewed and updated as appropriate:   Past Medical History:   Diagnosis Date   • Diverticulitis    • GERD (gastroesophageal reflux disease)    • Pleurisy      Past Surgical History:   Procedure Laterality Date   • COLONOSCOPY N/A 4/7/2017    Procedure: COLONOSCOPY;  Surgeon: Grover Pearson MD;  Location: [facility] ENDOSCOPY;  Service:    • COLONOSCOPY N/A 7/15/2020    Procedure: COLONOSCOPY;  Surgeon: Grover Pearson MD;  Location: [facility] ENDOSCOPY;  Service: Gastroenterology;  Laterality: N/A;   • ENDOSCOPY N/A 4/7/2017    Procedure: ESOPHAGOGASTRODUODENOSCOPY;  Surgeon: Grover Pearson MD;  Location: [facility] ENDOSCOPY;  Service:    • HERNIA REPAIR     • KNEE ARTHROSCOPY     • UPPER GASTROINTESTINAL ENDOSCOPY  04/07/2017   • VASECTOMY       Family History   Problem Relation Age of Onset   • Diverticulitis Mother    • No Known Problems Father        No Known Allergies  Social History     Socioeconomic History   • Marital status:      Spouse name: Not on file   • Number of children: Not on file   • Years of education: Not on file   • Highest education level: Not on file   Tobacco Use   • Smoking status: Current Every Day Smoker     Packs/day: 0.25     Types: Cigarettes   • Smokeless tobacco: Current User     Types: Chew   • Tobacco comment: 7/10/20 pt stated he smokes 3 per day    Substance and Sexual Activity   • Alcohol use: No   • Drug use: No   • Sexual activity: Defer     Current Medications:  Prior to Admission medications    Medication Sig Start Date End Date Taking? Authorizing Provider   ciprofloxacin (CIPRO) 500 MG tablet Take 500 mg by mouth As Needed.   Yes Provider, MD Maicol   DEXILANT 60 MG capsule TAKE 1 CAPSULE DAILY 5/29/20   "Yes Quincy Yousif PA-C   dicyclomine (BENTYL) 20 MG tablet Take 20 mg by mouth Every 6 (Six) Hours.   Yes Provider, MD Maicol   ibuprofen (ADVIL,MOTRIN) 800 MG tablet Take 800 mg by mouth As Needed. 1/3/18  Yes Provider, MD aMicol   LORazepam (ATIVAN) 1 MG tablet TAKE 1 TABLET BY MOUTH THREE TIMES A DAY OR LESS FOR NERVOUSNESS 10/5/18  Yes Provider, MD Maicol   metroNIDAZOLE (FLAGYL) 500 MG tablet Take 500 mg by mouth As Needed.   Yes Provider, MD Maicol   ondansetron ODT (ZOFRAN-ODT) 4 MG disintegrating tablet Take 1 tablet by mouth Every 8 (Eight) Hours As Needed for Nausea or Vomiting. 7/9/19  Yes Quincy Yousif PA-C   sodium-potassium-magnesium sulfates (SUPREP BOWEL PREP KIT) 17.5-3.13-1.6 GM/177ML solution oral solution As directed per instruction sheet for colonoscopy 3/9/20 7/22/20  Quincy Yousif PA-C     Review of Systems  Review of Systems       Objective    /84 (BP Location: Right arm)   Pulse 73   Ht 177.8 cm (70\")   Wt 103 kg (226 lb 6.4 oz)   BMI 32.49 kg/m²   Physical Exam   Constitutional: He is oriented to person, place, and time. He appears well-developed and well-nourished. No distress.   HENT:   Head: Normocephalic and atraumatic.   Eyes: Pupils are equal, round, and reactive to light. EOM are normal.   Neck: Normal range of motion.   Cardiovascular: Normal rate, regular rhythm and normal heart sounds.   Pulmonary/Chest: Effort normal and breath sounds normal.   Abdominal: Soft. Bowel sounds are normal. He exhibits no shifting dullness, no distension, no abdominal bruit, no ascites and no mass. There is no hepatosplenomegaly. There is tenderness. There is no rigidity, no rebound, no guarding and no CVA tenderness. No hernia. Hernia confirmed negative in the ventral area.   mild   Musculoskeletal: Normal range of motion.   Neurological: He is alert and oriented to person, place, and time.   Skin: Skin is warm and dry.   Psychiatric: He has a normal mood and " affect. His behavior is normal. Judgment and thought content normal.   Nursing note and vitals reviewed.    Assessment/Plan      1. Diverticulitis    2. Diverticulosis of large intestine without hemorrhage    3. Gastroesophageal reflux disease with esophagitis    4. Diverticulitis of large intestine without perforation or abscess without bleeding    .   Grover was seen today for heartburn and diverticulosis.    Diagnoses and all orders for this visit:    Diverticulitis    Diverticulosis of large intestine without hemorrhage    Gastroesophageal reflux disease with esophagitis    Diverticulitis of large intestine without perforation or abscess without bleeding  -     ondansetron ODT (ZOFRAN-ODT) 4 MG disintegrating tablet; Place 1 tablet on the tongue Every 8 (Eight) Hours As Needed for Nausea or Vomiting.    Other orders  -     ciprofloxacin (CIPRO) 500 MG tablet; Take 1 tablet by mouth 2 (Two) Times a Day.  -     metroNIDAZOLE (FLAGYL) 500 MG tablet; Take 1 tablet by mouth 2 (Two) Times a Day.  -     dicyclomine (BENTYL) 20 MG tablet; Take 1 tablet by mouth Every 6 (Six) Hours.  -     Discontinue: mesalamine (ASACOL) 800 MG EC tablet; Take 1 tablet by mouth 3 (Three) Times a Day.  -     mesalamine (LIALDA) 1.2 g EC tablet; Take 2 tablets by mouth daily        Orders placed during this encounter include:  No orders of the defined types were placed in this encounter.      Medications prescribed:  New Medications Ordered This Visit   Medications   • ciprofloxacin (CIPRO) 500 MG tablet     Sig: Take 1 tablet by mouth 2 (Two) Times a Day.     Dispense:  20 tablet     Refill:  0   • metroNIDAZOLE (FLAGYL) 500 MG tablet     Sig: Take 1 tablet by mouth 2 (Two) Times a Day.     Dispense:  20 tablet     Refill:  0   • dicyclomine (BENTYL) 20 MG tablet     Sig: Take 1 tablet by mouth Every 6 (Six) Hours.     Dispense:  90 tablet     Refill:  1   • ondansetron ODT (ZOFRAN-ODT) 4 MG disintegrating tablet     Sig: Place 1 tablet on  the tongue Every 8 (Eight) Hours As Needed for Nausea or Vomiting.     Dispense:  30 tablet     Refill:  1   • mesalamine (LIALDA) 1.2 g EC tablet     Sig: Take 2 tablets by mouth daily     Dispense:  60 tablet     Refill:  0     Discontinued Medications       Reason for Discontinue     sodium-potassium-magnesium sulfates (SUPREP BOWEL PREP KIT) 17.5-3.13-1.6 GM/177ML solution oral solution    *Therapy completed        Requested Prescriptions     Signed Prescriptions Disp Refills   • ciprofloxacin (CIPRO) 500 MG tablet 20 tablet 0     Sig: Take 1 tablet by mouth 2 (Two) Times a Day.   • metroNIDAZOLE (FLAGYL) 500 MG tablet 20 tablet 0     Sig: Take 1 tablet by mouth 2 (Two) Times a Day.   • dicyclomine (BENTYL) 20 MG tablet 90 tablet 1     Sig: Take 1 tablet by mouth Every 6 (Six) Hours.   • ondansetron ODT (ZOFRAN-ODT) 4 MG disintegrating tablet 30 tablet 1     Sig: Place 1 tablet on the tongue Every 8 (Eight) Hours As Needed for Nausea or Vomiting.   • mesalamine (LIALDA) 1.2 g EC tablet 60 tablet 0     Sig: Take 2 tablets by mouth daily       Review and/or summary of lab tests, radiology, procedures, medications. Review and summary of old records and obtaining of history. The risks and benefits of my recommendations, as well as other treatment options were discussed with the patient today. Questions were answered.    Follow-up: Return in about 3 months (around 10/22/2020), or if symptoms worsen or fail to improve.     * Surgery not found *      This document has been electronically signed by Quincy Yousif PA-C on July 29, 2020 18:42      Results for orders placed or performed during the hospital encounter of 07/15/20   COVID-19,  [facility] IN-HOUSE, NP SWAB IN TRANSPORT MEDIA 8-10 HR TAT - Swab, Nasopharynx   Result Value Ref Range    COVID19 Not Detected Not Detected - Ref. Range   Tissue Pathology Exam   Result Value Ref Range    Case Report       Surgical Pathology Report                         Case:  [id number]                                  Authorizing Provider:  Grovre Pearson MD        Collected:           07/15/2020 11:03 AM          Ordering Location:     [facility]             Received:            07/15/2020 01:40 PM                                 [facility]                                                     Pathologist:           Anastacia Card DO                                                        Specimens:   1) - Small Intestine, Ileum, terminal ileum   cold bx                                               2) - Large Intestine, colonic mucosa   cold bx                                                      3) - Large Intestine, Sigmoid Colon, sigmoid   cold bx                                     Final Diagnosis       See Scanned Report       Results for orders placed or performed in visit on 03/09/20   CBC Auto Differential   Result Value Ref Range    WBC 7.70 3.40 - 10.80 10*3/mm3    RBC 4.87 4.14 - 5.80 10*6/mm3    Hemoglobin 14.4 13.0 - 17.7 g/dL    Hematocrit 42.3 37.5 - 51.0 %    MCV 86.9 79.0 - 97.0 fL    MCH 29.6 26.6 - 33.0 pg    MCHC 34.0 31.5 - 35.7 g/dL    RDW 12.6 12.3 - 15.4 %    RDW-SD 39.5 37.0 - 54.0 fl    MPV 9.5 6.0 - 12.0 fL    Platelets 246 140 - 450 10*3/mm3    Neutrophil % 52.0 42.7 - 76.0 %    Lymphocyte % 35.5 19.6 - 45.3 %    Monocyte % 8.3 5.0 - 12.0 %    Eosinophil % 3.8 0.3 - 6.2 %    Basophil % 0.4 0.0 - 1.5 %    Neutrophils, Absolute 4.01 1.70 - 7.00 10*3/mm3    Lymphocytes, Absolute 2.73 0.70 - 3.10 10*3/mm3    Monocytes, Absolute 0.64 0.10 - 0.90 10*3/mm3    Eosinophils, Absolute 0.29 0.00 - 0.40 10*3/mm3    Basophils, Absolute 0.03 0.00 - 0.20 10*3/mm3   Comprehensive Metabolic Panel   Result Value Ref Range    Glucose 114 (H) 70 - 99 mg/dL    BUN 13 7 - 23 mg/dL    Creatinine 1.21 0.70 - 1.30 mg/dL    Sodium 140 137 - 145 mmol/L    Potassium 4.3 3.4 - 5.0 mmol/L    Chloride 104 101 - 112 mmol/L    CO2 25.0 22.0 - 30.0 mmol/L    Calcium 9.3  8.4 - 10.2 mg/dL    Total Protein 7.5 6.3 - 8.6 g/dL    Albumin 4.30 3.50 - 5.00 g/dL    ALT (SGPT) 55 (H) <=50 U/L    AST (SGOT) 42 17 - 59 U/L    Alkaline Phosphatase 48 38 - 126 U/L    Total Bilirubin 0.3 0.2 - 1.3 mg/dL    eGFR Non  Amer 65 63 - 147 mL/min/1.73    Globulin 3.2 2.3 - 3.5 gm/dL    A/G Ratio 1.3 1.1 - 1.8 g/dL    BUN/Creatinine Ratio 10.7 7.0 - 25.0    Anion Gap 11.0 5.0 - 15.0 mmol/L   Results for orders placed or performed in visit on 07/09/19   CBC Auto Differential   Result Value Ref Range    WBC 12.04 (H) 3.40 - 10.80 10*3/mm3    RBC 4.96 4.14 - 5.80 10*6/mm3    Hemoglobin 15.3 13.0 - 17.7 g/dL    Hematocrit 43.6 37.5 - 51.0 %    MCV 87.9 79.0 - 97.0 fL    MCH 30.8 26.6 - 33.0 pg    MCHC 35.1 31.5 - 35.7 g/dL    RDW 12.5 12.3 - 15.4 %    RDW-SD 39.4 37.0 - 54.0 fl    MPV 9.1 6.0 - 12.0 fL    Platelets 228 140 - 450 10*3/mm3    Neutrophil % 74.2 42.7 - 76.0 %    Lymphocyte % 17.0 (L) 19.6 - 45.3 %    Monocyte % 7.8 5.0 - 12.0 %    Eosinophil % 0.9 0.3 - 6.2 %    Basophil % 0.1 0.0 - 1.5 %    Neutrophils, Absolute 8.93 (H) 1.70 - 7.00 10*3/mm3    Lymphocytes, Absolute 2.05 0.70 - 3.10 10*3/mm3    Monocytes, Absolute 0.94 (H) 0.10 - 0.90 10*3/mm3    Eosinophils, Absolute 0.11 0.00 - 0.40 10*3/mm3    Basophils, Absolute 0.01 0.00 - 0.20 10*3/mm3   Results for orders placed or performed during the hospital encounter of 06/04/18   Gold Top - SST   Result Value Ref Range    Extra Tube Hold for add-ons.    Green Top (Gel)   Result Value Ref Range    Extra Tube Hold for add-ons.    CBC Auto Differential   Result Value Ref Range    WBC 7.03 3.20 - 9.80 10*3/mm3    RBC 4.96 4.37 - 5.74 10*6/mm3    Hemoglobin 14.9 13.7 - 17.3 g/dL    Hematocrit 42.8 39.0 - 49.0 %    MCV 86.3 80.0 - 98.0 fL    MCH 30.0 26.5 - 34.0 pg    MCHC 34.8 31.5 - 36.3 g/dL    RDW 12.5 11.5 - 14.5 %    RDW-SD 40.0 35.1 - 43.9 fl    MPV 9.6 8.0 - 12.0 fL    Platelets 227 150 - 450 10*3/mm3    Neutrophil % 60.6 37.0 - 80.0 %     Lymphocyte % 29.0 10.0 - 50.0 %    Monocyte % 8.5 0.0 - 12.0 %    Eosinophil % 1.7 0.0 - 7.0 %    Basophil % 0.1 0.0 - 2.0 %    Immature Grans % 0.1 0.0 - 0.5 %    Neutrophils, Absolute 4.25 2.00 - 8.60 10*3/mm3    Lymphocytes, Absolute 2.04 0.60 - 4.20 10*3/mm3    Monocytes, Absolute 0.60 0.00 - 0.90 10*3/mm3    Eosinophils, Absolute 0.12 0.00 - 0.70 10*3/mm3    Basophils, Absolute 0.01 0.00 - 0.20 10*3/mm3    Immature Grans, Absolute 0.01 0.00 - 0.02 10*3/mm3   Lavender Top   Result Value Ref Range    Extra Tube hold for add-on    Light Blue Top   Result Value Ref Range    Extra Tube hold for add-on    Troponin   Result Value Ref Range    Troponin I <0.012 <=0.034 ng/mL   Protime-INR   Result Value Ref Range    Protime 13.1 11.1 - 15.3 Seconds    INR 1.01 0.80 - 1.20   BNP   Result Value Ref Range    proBNP 33.2 0.0 - 450.0 pg/mL   Comprehensive Metabolic Panel   Result Value Ref Range    Glucose 93 60 - 100 mg/dL    BUN 11 7 - 21 mg/dL    Creatinine 0.95 0.70 - 1.30 mg/dL    Sodium 141 137 - 145 mmol/L    Potassium 4.1 3.5 - 5.1 mmol/L    Chloride 105 95 - 110 mmol/L    CO2 25.0 22.0 - 31.0 mmol/L    Calcium 9.0 8.4 - 10.2 mg/dL    Total Protein 7.4 6.3 - 8.6 g/dL    Albumin 4.40 3.40 - 4.80 g/dL    ALT (SGPT) 50 21 - 72 U/L    AST (SGOT) 35 17 - 59 U/L    Alkaline Phosphatase 45 38 - 126 U/L    Total Bilirubin 0.4 0.2 - 1.3 mg/dL    eGFR Non  Amer 86 63 - 147 mL/min/1.73    Globulin 3.0 2.3 - 3.5 gm/dL    A/G Ratio 1.5 1.1 - 1.8 g/dL    BUN/Creatinine Ratio 11.6 7.0 - 25.0    Anion Gap 11.0 5.0 - 15.0 mmol/L     *Note: Due to a large number of results and/or encounters for the requested time period, some results have not been displayed. A complete set of results can be found in Results Review.       Some portions of this note have been dictated using voice recognition software and may contain errors and/or omissions.

## 2020-07-29 ENCOUNTER — TELEPHONE (OUTPATIENT)
Dept: GASTROENTEROLOGY | Facility: CLINIC | Age: 45
End: 2020-07-29

## 2020-07-29 RX ORDER — MESALAMINE 1.2 G/1
TABLET, DELAYED RELEASE ORAL
Qty: 60 TABLET | Refills: 0 | Status: SHIPPED | OUTPATIENT
Start: 2020-07-29 | End: 2020-11-09 | Stop reason: SINTOL

## 2020-07-29 NOTE — TELEPHONE ENCOUNTER
----- Message from Quincy Yousif PA-C sent at 7/28/2020  4:35 PM CDT -----  1.2gram --2 daily  ----- Message -----  From: Maame Sinha MA  Sent: 7/28/2020   4:02 PM CDT  To: Quincy Yousif PA-C    PATIENTS INSURANCE WILL NOT COVER MESALAMINE (ASACOL) 800 MG IS NOT COVERED BY HIS INSURANCE COMPANY. INSURANCE PREFERS GENERIC LIALDA.

## 2020-07-29 NOTE — TELEPHONE ENCOUNTER
Patient has been contacted and made aware that his insurance company would not cover his Rx for Mesalamine (Asacol) therefore an Rx for Mesalamine (Liald) 1.2 g bid has been sent to [facility]. Patient voiced understanding.

## 2020-10-07 DIAGNOSIS — K57.32 DIVERTICULITIS OF LARGE INTESTINE WITHOUT PERFORATION OR ABSCESS WITHOUT BLEEDING: Primary | ICD-10-CM

## 2020-10-08 ENCOUNTER — LAB (OUTPATIENT)
Dept: LAB | Facility: OTHER | Age: 45
End: 2020-10-08

## 2020-10-08 LAB
ALBUMIN SERPL-MCNC: 4.5 G/DL (ref 3.5–5)
ALBUMIN/GLOB SERPL: 1.4 G/DL (ref 1.1–1.8)
ALP SERPL-CCNC: 53 U/L (ref 38–126)
ALT SERPL W P-5'-P-CCNC: 57 U/L
ANION GAP SERPL CALCULATED.3IONS-SCNC: 8 MMOL/L (ref 5–15)
AST SERPL-CCNC: 43 U/L (ref 17–59)
BASOPHILS # BLD AUTO: 0.02 10*3/MM3 (ref 0–0.2)
BASOPHILS NFR BLD AUTO: 0.3 % (ref 0–1.5)
BILIRUB SERPL-MCNC: 0.7 MG/DL (ref 0.2–1.3)
BUN SERPL-MCNC: 11 MG/DL (ref 7–23)
BUN/CREAT SERPL: 9.2 (ref 7–25)
CALCIUM SPEC-SCNC: 9.2 MG/DL (ref 8.4–10.2)
CHLORIDE SERPL-SCNC: 101 MMOL/L (ref 101–112)
CO2 SERPL-SCNC: 29 MMOL/L (ref 22–30)
CREAT SERPL-MCNC: 1.19 MG/DL (ref 0.7–1.3)
DEPRECATED RDW RBC AUTO: 39 FL (ref 37–54)
EOSINOPHIL # BLD AUTO: 0.21 10*3/MM3 (ref 0–0.4)
EOSINOPHIL NFR BLD AUTO: 2.9 % (ref 0.3–6.2)
ERYTHROCYTE [DISTWIDTH] IN BLOOD BY AUTOMATED COUNT: 12.3 % (ref 12.3–15.4)
GFR SERPL CREATININE-BSD FRML MDRD: 66 ML/MIN/1.73 (ref 63–147)
GLOBULIN UR ELPH-MCNC: 3.2 GM/DL (ref 2.3–3.5)
GLUCOSE SERPL-MCNC: 97 MG/DL (ref 70–99)
HCT VFR BLD AUTO: 42.3 % (ref 37.5–51)
HGB BLD-MCNC: 14.3 G/DL (ref 13–17.7)
LYMPHOCYTES # BLD AUTO: 2.65 10*3/MM3 (ref 0.7–3.1)
LYMPHOCYTES NFR BLD AUTO: 36.7 % (ref 19.6–45.3)
MCH RBC QN AUTO: 29.6 PG (ref 26.6–33)
MCHC RBC AUTO-ENTMCNC: 33.8 G/DL (ref 31.5–35.7)
MCV RBC AUTO: 87.6 FL (ref 79–97)
MONOCYTES # BLD AUTO: 0.64 10*3/MM3 (ref 0.1–0.9)
MONOCYTES NFR BLD AUTO: 8.9 % (ref 5–12)
NEUTROPHILS NFR BLD AUTO: 3.71 10*3/MM3 (ref 1.7–7)
NEUTROPHILS NFR BLD AUTO: 51.2 % (ref 42.7–76)
PLATELET # BLD AUTO: 239 10*3/MM3 (ref 140–450)
PMV BLD AUTO: 9.8 FL (ref 6–12)
POTASSIUM SERPL-SCNC: 4.3 MMOL/L (ref 3.4–5)
PROT SERPL-MCNC: 7.7 G/DL (ref 6.3–8.6)
RBC # BLD AUTO: 4.83 10*6/MM3 (ref 4.14–5.8)
SODIUM SERPL-SCNC: 138 MMOL/L (ref 137–145)
WBC # BLD AUTO: 7.23 10*3/MM3 (ref 3.4–10.8)

## 2020-10-08 PROCEDURE — 80053 COMPREHEN METABOLIC PANEL: CPT | Performed by: INTERNAL MEDICINE

## 2020-10-08 PROCEDURE — 85025 COMPLETE CBC W/AUTO DIFF WBC: CPT | Performed by: INTERNAL MEDICINE

## 2020-10-08 PROCEDURE — 36415 COLL VENOUS BLD VENIPUNCTURE: CPT | Performed by: INTERNAL MEDICINE

## 2020-11-09 ENCOUNTER — OFFICE VISIT (OUTPATIENT)
Dept: GASTROENTEROLOGY | Facility: CLINIC | Age: 45
End: 2020-11-09

## 2020-11-09 VITALS
WEIGHT: 227.8 LBS | HEART RATE: 86 BPM | HEIGHT: 70 IN | BODY MASS INDEX: 32.61 KG/M2 | DIASTOLIC BLOOD PRESSURE: 77 MMHG | SYSTOLIC BLOOD PRESSURE: 117 MMHG

## 2020-11-09 DIAGNOSIS — Z87.19 HISTORY OF DIVERTICULITIS: ICD-10-CM

## 2020-11-09 DIAGNOSIS — K21.00 GASTROESOPHAGEAL REFLUX DISEASE WITH ESOPHAGITIS WITHOUT HEMORRHAGE: ICD-10-CM

## 2020-11-09 DIAGNOSIS — R10.33 PERIUMBILICAL ABDOMINAL PAIN: ICD-10-CM

## 2020-11-09 DIAGNOSIS — K57.30 DIVERTICULOSIS OF LARGE INTESTINE WITHOUT HEMORRHAGE: Primary | ICD-10-CM

## 2020-11-09 PROCEDURE — 99213 OFFICE O/P EST LOW 20 MIN: CPT | Performed by: PHYSICIAN ASSISTANT

## 2020-11-09 RX ORDER — DICYCLOMINE HCL 20 MG
20 TABLET ORAL AS NEEDED
COMMUNITY

## 2020-11-09 NOTE — PROGRESS NOTES
Chief Complaint   Patient presents with   • Diverticulitis   • Heartburn   • Diverticulosis       ENDO PROCEDURE ORDERED:    Subjective    Grover Lee is a 45 y.o. male. he is here today for follow-up.    History of Present Illness    The patient is seen on a recheck of his GERD, diverticulosis, recurrent diverticulitis. Last seen 07/22/2020. I had given him a trial on Lialda for recurrent diverticulitis. He states this seemed to cause him to have more diarrhea. He only took it for a few days. He states that since his last appointment he has been treated for pneumonia. He thinks he had another flare of his diverticulitis in 10/2020. He does have antibiotics if needed. I encouraged him to contact me if he has another bout. Currently he denies abdominal pain. GERD is doing well on the Dexilant. He denied nausea, vomiting. Bowels are moving without blood or mucus. Weight is up 1.2 pounds since last visit. Last colonoscopy 07/15/2020. It showed sigmoid colitis with diverticulosis. He does take Cipro, Flagyl and Bentyl as needed.    The patient had laboratory on 10/08/2020. It showed fairly normal CBC. CMP showed an ALT of 57, otherwise normal.    ASSESSMENT/PLAN: Patient with recurrent diverticulitis. We have again discussed the potential surgical intervention, possibly repeating endoscopy, trying another medication. He would like to continue current regimen. He was encouraged high-fiber/diverticular diet. We will plan follow up in 6 months. He does have medications on hand as needed. I have asked him to notify me if he has another episode. The patient was agreeable.       The following portions of the patient's history were reviewed and updated as appropriate:   Past Medical History:   Diagnosis Date   • Diverticulitis    • GERD (gastroesophageal reflux disease)    • Pleurisy      Past Surgical History:   Procedure Laterality Date   • COLONOSCOPY N/A 4/7/2017    Procedure: COLONOSCOPY;  Surgeon: Grover RICH  MD Michel;  Location: [facility] ENDOSCOPY;  Service:    • COLONOSCOPY N/A 7/15/2020    Procedure: COLONOSCOPY;  Surgeon: Grover Pearson MD;  Location: [facility] ENDOSCOPY;  Service: Gastroenterology;  Laterality: N/A;   • ENDOSCOPY N/A 4/7/2017    Procedure: ESOPHAGOGASTRODUODENOSCOPY;  Surgeon: Grover Pearson MD;  Location: [facility] ENDOSCOPY;  Service:    • HERNIA REPAIR     • KNEE ARTHROSCOPY     • UPPER GASTROINTESTINAL ENDOSCOPY  04/07/2017   • VASECTOMY       Family History   Problem Relation Age of Onset   • Diverticulitis Mother    • No Known Problems Father        No Known Allergies  Social History     Socioeconomic History   • Marital status:      Spouse name: Not on file   • Number of children: Not on file   • Years of education: Not on file   • Highest education level: Not on file   Tobacco Use   • Smoking status: Current Every Day Smoker     Packs/day: 0.25     Types: Cigarettes   • Smokeless tobacco: Current User     Types: Chew   • Tobacco comment: 7/10/20 pt stated he smokes 3 per day    Substance and Sexual Activity   • Alcohol use: No   • Drug use: No   • Sexual activity: Defer     Current Medications:  Prior to Admission medications    Medication Sig Start Date End Date Taking? Authorizing Provider   ciprofloxacin (CIPRO) 500 MG tablet Take 1 tablet by mouth 2 (Two) Times a Day.  Patient taking differently: Take 500 mg by mouth As Needed. 7/22/20  Yes Quincy Yousif PA-C   DEXILANT 60 MG capsule TAKE 1 CAPSULE DAILY 5/29/20  Yes Quincy Yousif PA-C   dicyclomine (BENTYL) 20 MG tablet Take 20 mg by mouth As Needed.   Yes Maicol Espinosa MD   ibuprofen (ADVIL,MOTRIN) 800 MG tablet Take 800 mg by mouth As Needed. 1/3/18  Yes Maicol Espinosa MD   LORazepam (ATIVAN) 1 MG tablet TAKE 1 TABLET BY MOUTH THREE TIMES A DAY OR LESS FOR NERVOUSNESS 10/5/18  Yes Maicol Espinosa MD   metroNIDAZOLE (FLAGYL) 500 MG tablet Take 1 tablet by mouth 2 (Two) Times a Day.  Patient taking  "differently: Take 500 mg by mouth As Needed. 7/22/20  Yes Quincy Yousif PA-C   ondansetron ODT (ZOFRAN-ODT) 4 MG disintegrating tablet Place 1 tablet on the tongue Every 8 (Eight) Hours As Needed for Nausea or Vomiting. 7/22/20  Yes Quincy Yousif PA-C   dicyclomine (BENTYL) 20 MG tablet Take 1 tablet by mouth Every 6 (Six) Hours. 7/22/20 11/9/20 Yes Quincy Yousif PA-C   mesalamine (LIALDA) 1.2 g EC tablet Take 2 tablets by mouth daily 7/29/20 11/9/20 Yes Quincy Yousif PA-C     Review of Systems  Review of Systems       Objective    /77   Pulse 86   Ht 177.8 cm (70\")   Wt 103 kg (227 lb 12.8 oz)   BMI 32.69 kg/m²   Physical Exam  Vitals signs and nursing note reviewed.   Constitutional:       General: He is not in acute distress.     Appearance: He is well-developed.   HENT:      Head: Normocephalic and atraumatic.   Eyes:      Pupils: Pupils are equal, round, and reactive to light.   Neck:      Musculoskeletal: Normal range of motion.   Cardiovascular:      Rate and Rhythm: Normal rate and regular rhythm.      Heart sounds: Normal heart sounds.   Pulmonary:      Effort: Pulmonary effort is normal.      Breath sounds: Normal breath sounds.   Abdominal:      General: Bowel sounds are normal. There is no distension or abdominal bruit.      Palpations: Abdomen is soft. Abdomen is not rigid. There is no shifting dullness or mass.      Tenderness: There is abdominal tenderness. There is no guarding or rebound.      Hernia: No hernia is present. There is no hernia in the ventral area.      Comments: mild   Musculoskeletal: Normal range of motion.   Skin:     General: Skin is warm and dry.   Neurological:      Mental Status: He is alert and oriented to person, place, and time.   Psychiatric:         Behavior: Behavior normal.         Thought Content: Thought content normal.         Judgment: Judgment normal.       Assessment/Plan      1. Diverticulosis of large intestine without hemorrhage    2. " History of diverticulitis    3. Gastroesophageal reflux disease with esophagitis without hemorrhage    4. Periumbilical abdominal pain    .   Diagnoses and all orders for this visit:    1. Diverticulosis of large intestine without hemorrhage (Primary)    2. History of diverticulitis    3. Gastroesophageal reflux disease with esophagitis without hemorrhage    4. Periumbilical abdominal pain        Orders placed during this encounter include:  No orders of the defined types were placed in this encounter.      Medications prescribed:  No orders of the defined types were placed in this encounter.    Discontinued Medications       Reason for Discontinue     dicyclomine (BENTYL) 20 MG tablet    Side effects     mesalamine (LIALDA) 1.2 g EC tablet    Side effects        Requested Prescriptions      No prescriptions requested or ordered in this encounter       Review and/or summary of lab tests, radiology, procedures, medications. Review and summary of old records and obtaining of history. The risks and benefits of my recommendations, as well as other treatment options were discussed with the patient today. Questions were answered.    Follow-up: Return in about 6 months (around 5/9/2021), or if symptoms worsen or fail to improve.     * Surgery not found *      This document has been electronically signed by Quincy Yousif PA-C on November 20, 2020 15:12 CST      Results for orders placed or performed in visit on 10/07/20   CBC Auto Differential    Specimen: Blood   Result Value Ref Range    WBC 7.23 3.40 - 10.80 10*3/mm3    RBC 4.83 4.14 - 5.80 10*6/mm3    Hemoglobin 14.3 13.0 - 17.7 g/dL    Hematocrit 42.3 37.5 - 51.0 %    MCV 87.6 79.0 - 97.0 fL    MCH 29.6 26.6 - 33.0 pg    MCHC 33.8 31.5 - 35.7 g/dL    RDW 12.3 12.3 - 15.4 %    RDW-SD 39.0 37.0 - 54.0 fl    MPV 9.8 6.0 - 12.0 fL    Platelets 239 140 - 450 10*3/mm3    Neutrophil % 51.2 42.7 - 76.0 %    Lymphocyte % 36.7 19.6 - 45.3 %    Monocyte % 8.9 5.0 - 12.0 %     Eosinophil % 2.9 0.3 - 6.2 %    Basophil % 0.3 0.0 - 1.5 %    Neutrophils, Absolute 3.71 1.70 - 7.00 10*3/mm3    Lymphocytes, Absolute 2.65 0.70 - 3.10 10*3/mm3    Monocytes, Absolute 0.64 0.10 - 0.90 10*3/mm3    Eosinophils, Absolute 0.21 0.00 - 0.40 10*3/mm3    Basophils, Absolute 0.02 0.00 - 0.20 10*3/mm3   Comprehensive Metabolic Panel    Specimen: Blood   Result Value Ref Range    Glucose 97 70 - 99 mg/dL    BUN 11 7 - 23 mg/dL    Creatinine 1.19 0.70 - 1.30 mg/dL    Sodium 138 137 - 145 mmol/L    Potassium 4.3 3.4 - 5.0 mmol/L    Chloride 101 101 - 112 mmol/L    CO2 29.0 22.0 - 30.0 mmol/L    Calcium 9.2 8.4 - 10.2 mg/dL    Total Protein 7.7 6.3 - 8.6 g/dL    Albumin 4.50 3.50 - 5.00 g/dL    ALT (SGPT) 57 (H) <=50 U/L    AST (SGOT) 43 17 - 59 U/L    Alkaline Phosphatase 53 38 - 126 U/L    Total Bilirubin 0.7 0.2 - 1.3 mg/dL    eGFR Non  Amer 66 63 - 147 mL/min/1.73    Globulin 3.2 2.3 - 3.5 gm/dL    A/G Ratio 1.4 1.1 - 1.8 g/dL    BUN/Creatinine Ratio 9.2 7.0 - 25.0    Anion Gap 8.0 5.0 - 15.0 mmol/L   Results for orders placed or performed during the hospital encounter of 07/15/20   COVID-19, [facility] IN-HOUSE, NP SWAB IN TRANSPORT MEDIA 8-10 HR TAT - Swab, Nasopharynx    Specimen: Nasopharynx; Swab   Result Value Ref Range    COVID19 Not Detected Not Detected - Ref. Range   Tissue Pathology Exam    Specimen: A: Small Intestine, Ileum; Tissue    B: Large Intestine; Tissue    C: Large Intestine, Sigmoid Colon; Tissue   Result Value Ref Range    Case Report       Surgical Pathology Report                         Case: [id number]                                  Authorizing Provider:  Grover Pearson MD        Collected:           07/15/2020 11:03 AM          Ordering Location:     [facility]:            07/15/2020 01:40 PM                                 [place] [facility]                                                     Pathologist:           Anastacia Card, DO                                                         Specimens:   1) - Small Intestine, Ileum, terminal ileum   cold bx                                               2) - Large Intestine, colonic mucosa   cold bx                                                      3) - Large Intestine, Sigmoid Colon, sigmoid   cold bx                                     Final Diagnosis       See Scanned Report       Results for orders placed or performed in visit on 03/09/20   CBC Auto Differential    Specimen: Blood   Result Value Ref Range    WBC 7.70 3.40 - 10.80 10*3/mm3    RBC 4.87 4.14 - 5.80 10*6/mm3    Hemoglobin 14.4 13.0 - 17.7 g/dL    Hematocrit 42.3 37.5 - 51.0 %    MCV 86.9 79.0 - 97.0 fL    MCH 29.6 26.6 - 33.0 pg    MCHC 34.0 31.5 - 35.7 g/dL    RDW 12.6 12.3 - 15.4 %    RDW-SD 39.5 37.0 - 54.0 fl    MPV 9.5 6.0 - 12.0 fL    Platelets 246 140 - 450 10*3/mm3    Neutrophil % 52.0 42.7 - 76.0 %    Lymphocyte % 35.5 19.6 - 45.3 %    Monocyte % 8.3 5.0 - 12.0 %    Eosinophil % 3.8 0.3 - 6.2 %    Basophil % 0.4 0.0 - 1.5 %    Neutrophils, Absolute 4.01 1.70 - 7.00 10*3/mm3    Lymphocytes, Absolute 2.73 0.70 - 3.10 10*3/mm3    Monocytes, Absolute 0.64 0.10 - 0.90 10*3/mm3    Eosinophils, Absolute 0.29 0.00 - 0.40 10*3/mm3    Basophils, Absolute 0.03 0.00 - 0.20 10*3/mm3   Comprehensive Metabolic Panel    Specimen: Blood   Result Value Ref Range    Glucose 114 (H) 70 - 99 mg/dL    BUN 13 7 - 23 mg/dL    Creatinine 1.21 0.70 - 1.30 mg/dL    Sodium 140 137 - 145 mmol/L    Potassium 4.3 3.4 - 5.0 mmol/L    Chloride 104 101 - 112 mmol/L    CO2 25.0 22.0 - 30.0 mmol/L    Calcium 9.3 8.4 - 10.2 mg/dL    Total Protein 7.5 6.3 - 8.6 g/dL    Albumin 4.30 3.50 - 5.00 g/dL    ALT (SGPT) 55 (H) <=50 U/L    AST (SGOT) 42 17 - 59 U/L    Alkaline Phosphatase 48 38 - 126 U/L    Total Bilirubin 0.3 0.2 - 1.3 mg/dL    eGFR Non  Amer 65 63 - 147 mL/min/1.73    Globulin 3.2 2.3 - 3.5 gm/dL    A/G Ratio 1.3 1.1 - 1.8 g/dL    BUN/Creatinine  Ratio 10.7 7.0 - 25.0    Anion Gap 11.0 5.0 - 15.0 mmol/L   Results for orders placed or performed in visit on 07/09/19   CBC Auto Differential    Specimen: Blood   Result Value Ref Range    WBC 12.04 (H) 3.40 - 10.80 10*3/mm3    RBC 4.96 4.14 - 5.80 10*6/mm3    Hemoglobin 15.3 13.0 - 17.7 g/dL    Hematocrit 43.6 37.5 - 51.0 %    MCV 87.9 79.0 - 97.0 fL    MCH 30.8 26.6 - 33.0 pg    MCHC 35.1 31.5 - 35.7 g/dL    RDW 12.5 12.3 - 15.4 %    RDW-SD 39.4 37.0 - 54.0 fl    MPV 9.1 6.0 - 12.0 fL    Platelets 228 140 - 450 10*3/mm3    Neutrophil % 74.2 42.7 - 76.0 %    Lymphocyte % 17.0 (L) 19.6 - 45.3 %    Monocyte % 7.8 5.0 - 12.0 %    Eosinophil % 0.9 0.3 - 6.2 %    Basophil % 0.1 0.0 - 1.5 %    Neutrophils, Absolute 8.93 (H) 1.70 - 7.00 10*3/mm3    Lymphocytes, Absolute 2.05 0.70 - 3.10 10*3/mm3    Monocytes, Absolute 0.94 (H) 0.10 - 0.90 10*3/mm3    Eosinophils, Absolute 0.11 0.00 - 0.40 10*3/mm3    Basophils, Absolute 0.01 0.00 - 0.20 10*3/mm3     *Note: Due to a large number of results and/or encounters for the requested time period, some results have not been displayed. A complete set of results can be found in Results Review.       Some portions of this note have been dictated using voice recognition software and may contain errors and/or omissions.

## 2020-11-09 NOTE — PATIENT INSTRUCTIONS
"BMI for Adults  What is BMI?  Body mass index (BMI) is a number that is calculated from a person's weight and height. BMI can help estimate how much of a person's weight is composed of fat. BMI does not measure body fat directly. Rather, it is an alternative to procedures that directly measure body fat, which can be difficult and expensive.  BMI can help identify people who may be at higher risk for certain medical problems.  What are BMI measurements used for?  BMI is used as a screening tool to identify possible weight problems. It helps determine whether a person is obese, overweight, a healthy weight, or underweight.  BMI is useful for:  · Identifying a weight problem that may be related to a medical condition or may increase the risk for medical problems.  · Promoting changes, such as changes in diet and exercise, to help reach a healthy weight. BMI screening can be repeated to see if these changes are working.  How is BMI calculated?  BMI involves measuring your weight in relation to your height. Both height and weight are measured, and the BMI is calculated from those numbers. This can be done either in English ([place] or metric measurements. Note that charts and online BMI calculators are available to help you find your BMI quickly and easily without having to do these calculations yourself.  To calculate your BMI in English ([place] measurements:    1. Measure your weight in pounds (lb).  2. Multiply the number of pounds by 703.  ? For example, for a person who weighs 180 lb, multiply that number by 703, which equals 126,540.  3. Measure your height in inches. Then multiply that number by itself to get a measurement called \"inches squared.\"  ? For example, for a person who is 70 inches tall, the \"inches squared\" measurement is 70 inches x 70 inches, which equals 4,900 inches squared.  4. Divide the total from step 2 (number of lb x 703) by the total from step 3 (inches squared): 126,540 ÷ 4,900 = 25.8. This is " "your BMI.  To calculate your BMI in metric measurements:  1. Measure your weight in kilograms (kg).  2. Measure your height in meters (m). Then multiply that number by itself to get a measurement called \"meters squared.\"  ? For example, for a person who is 1.75 m tall, the \"meters squared\" measurement is 1.75 m x 1.75 m, which is equal to 3.1 meters squared.  3. Divide the number of kilograms (your weight) by the meters squared number. In this example: 70 ÷ 3.1 = 22.6. This is your BMI.  What do the results mean?  BMI charts are used to identify whether you are underweight, normal weight, overweight, or obese. The following guidelines will be used:  · Underweight: BMI less than 18.5.  · Normal weight: BMI between 18.5 and 24.9.  · Overweight: BMI between 25 and 29.9.  · Obese: BMI of 30 or above.  Keep these notes in mind:  · Weight includes both fat and muscle, so someone with a muscular build, such as an athlete, may have a BMI that is higher than 24.9. In cases like these, BMI is not an accurate measure of body fat.  · To determine if excess body fat is the cause of a BMI of 25 or higher, further assessments may need to be done by a health care provider.  · BMI is usually interpreted in the same way for men and women.  Where to find more information  For more information about BMI, including tools to quickly calculate your BMI, go to these websites:  · Centers for Disease Control and Prevention: www.cdc.gov  · American Heart Association: www.heart.org  · National Heart, Lung, and Blood [place]: www.nhlbi.nih.gov  Summary  · Body mass index (BMI) is a number that is calculated from a person's weight and height.  · BMI may help estimate how much of a person's weight is composed of fat. BMI can help identify those who may be at higher risk for certain medical problems.  · BMI can be measured using English measurements or metric measurements.  · BMI charts are used to identify whether you are underweight, normal " weight, overweight, or obese.  This information is not intended to replace advice given to you by your health care provider. Make sure you discuss any questions you have with your health care provider.  Document Released: 08/29/2005 Document Revised: 09/09/2020 Document Reviewed: 07/17/2020  Elsevier Patient Education © 2020 [employer]

## 2020-11-20 RX ORDER — DEXLANSOPRAZOLE 60 MG/1
CAPSULE, DELAYED RELEASE ORAL
Qty: 90 CAPSULE | Refills: 1 | Status: SHIPPED | OUTPATIENT
Start: 2020-11-20 | End: 2021-05-21

## 2021-05-21 RX ORDER — DEXLANSOPRAZOLE 60 MG/1
CAPSULE, DELAYED RELEASE ORAL
Qty: 90 CAPSULE | Refills: 0 | Status: SHIPPED | OUTPATIENT
Start: 2021-05-21 | End: 2021-08-02

## 2021-08-02 RX ORDER — DEXLANSOPRAZOLE 60 MG/1
CAPSULE, DELAYED RELEASE ORAL
Qty: 90 CAPSULE | Refills: 0 | Status: SHIPPED | OUTPATIENT
Start: 2021-08-02 | End: 2021-10-26

## 2021-10-26 RX ORDER — DEXLANSOPRAZOLE 60 MG/1
CAPSULE, DELAYED RELEASE ORAL
Qty: 90 CAPSULE | Refills: 0 | Status: SHIPPED | OUTPATIENT
Start: 2021-10-26

## 2023-08-16 ENCOUNTER — CLINICAL SUPPORT (OUTPATIENT)
Dept: FAMILY MEDICINE CLINIC | Facility: CLINIC | Age: 48
End: 2023-08-16

## 2023-08-16 VITALS
DIASTOLIC BLOOD PRESSURE: 70 MMHG | HEART RATE: 58 BPM | BODY MASS INDEX: 32.07 KG/M2 | WEIGHT: 224 LBS | HEIGHT: 70 IN | OXYGEN SATURATION: 98 % | SYSTOLIC BLOOD PRESSURE: 118 MMHG

## 2023-08-16 DIAGNOSIS — Z02.89 ENCOUNTER FOR EXAMINATION REQUIRED BY DEPARTMENT OF TRANSPORTATION (DOT): Primary | ICD-10-CM

## 2023-08-16 NOTE — PROGRESS NOTES
Grover Lee is a 48 y.o. male who presents to the office for a DOT Exam. See Federal DOT examination form for details of this visit.

## (undated) DEVICE — CANN SMPL SOFTECH BIFLO ETCO2 A/M 7FT

## (undated) DEVICE — SINGLE-USE BIOPSY FORCEPS: Brand: RADIAL JAW 4

## (undated) DEVICE — BITEBLOCK ENDO W/STRAP 60F A/ LF DISP